# Patient Record
Sex: FEMALE | Race: WHITE
[De-identification: names, ages, dates, MRNs, and addresses within clinical notes are randomized per-mention and may not be internally consistent; named-entity substitution may affect disease eponyms.]

---

## 2017-03-22 ENCOUNTER — HOSPITAL ENCOUNTER (OUTPATIENT)
Dept: HOSPITAL 45 - C.LABSPEC | Age: 37
Discharge: HOME | End: 2017-03-22
Attending: PHYSICIAN ASSISTANT
Payer: COMMERCIAL

## 2017-03-22 DIAGNOSIS — N89.8: ICD-10-CM

## 2017-03-22 DIAGNOSIS — N94.9: Primary | ICD-10-CM

## 2017-03-22 DIAGNOSIS — Z11.3: ICD-10-CM

## 2017-03-25 LAB
CHLAMYDIA TRACH RNA***: NOT DETECTED
GC (NEIS GONORRHOEAE)RNA**: NOT DETECTED

## 2017-03-28 LAB
HERPES SIMPLEX CULT SOURCE: (no result)
HSV SPEC CULT: (no result)

## 2017-03-29 ENCOUNTER — HOSPITAL ENCOUNTER (INPATIENT)
Dept: HOSPITAL 45 - C.EDA | Age: 37
LOS: 2 days | Discharge: PSYCHIATRIC OR SUBSTANCE USE FACILITY | DRG: 881 | End: 2017-03-31
Attending: FAMILY MEDICINE | Admitting: HOSPITALIST
Payer: COMMERCIAL

## 2017-03-29 VITALS
SYSTOLIC BLOOD PRESSURE: 130 MMHG | HEART RATE: 87 BPM | TEMPERATURE: 98.24 F | DIASTOLIC BLOOD PRESSURE: 71 MMHG | OXYGEN SATURATION: 99 %

## 2017-03-29 VITALS
HEIGHT: 66 IN | WEIGHT: 231.49 LBS | BODY MASS INDEX: 37.2 KG/M2 | BODY MASS INDEX: 37.2 KG/M2 | HEIGHT: 66 IN | WEIGHT: 231.49 LBS

## 2017-03-29 VITALS — DIASTOLIC BLOOD PRESSURE: 71 MMHG | TEMPERATURE: 98.24 F | HEART RATE: 87 BPM | SYSTOLIC BLOOD PRESSURE: 130 MMHG

## 2017-03-29 VITALS — OXYGEN SATURATION: 100 %

## 2017-03-29 DIAGNOSIS — I45.81: ICD-10-CM

## 2017-03-29 DIAGNOSIS — I10: ICD-10-CM

## 2017-03-29 DIAGNOSIS — F17.210: ICD-10-CM

## 2017-03-29 DIAGNOSIS — F32.9: Primary | ICD-10-CM

## 2017-03-29 DIAGNOSIS — F10.239: ICD-10-CM

## 2017-03-29 DIAGNOSIS — T50.902A: ICD-10-CM

## 2017-03-29 DIAGNOSIS — R45.851: ICD-10-CM

## 2017-03-29 LAB
ALBUMIN/GLOB SERPL: 1.4 {RATIO} (ref 0.9–2)
ALP SERPL-CCNC: 48 U/L (ref 45–117)
ALP SERPL-CCNC: 50 U/L (ref 45–117)
ALT SERPL-CCNC: 14 U/L (ref 12–78)
ALT SERPL-CCNC: 18 U/L (ref 12–78)
ANION GAP SERPL CALC-SCNC: 10 MMOL/L (ref 3–11)
APAP SERPL-MCNC: (no result) UG/ML (ref 10–30)
AST SERPL-CCNC: 13 U/L (ref 15–37)
AST SERPL-CCNC: 14 U/L (ref 15–37)
BASOPHILS # BLD: 0.02 K/UL (ref 0–0.2)
BASOPHILS NFR BLD: 0.2 %
BENZODIAZ UR-MCNC: (no result) UG/L
BUN SERPL-MCNC: 6 MG/DL (ref 7–18)
BUN/CREAT SERPL: 7.5 (ref 10–20)
CALCIUM SERPL-MCNC: 9.2 MG/DL (ref 8.5–10.1)
CHLORIDE SERPL-SCNC: 106 MMOL/L (ref 98–107)
CO2 SERPL-SCNC: 25 MMOL/L (ref 21–32)
COMPLETE: YES
CREAT CL PREDICTED SERPL C-G-VRATE: 120.9 ML/MIN
CREAT SERPL-MCNC: 0.85 MG/DL (ref 0.6–1.2)
EOSINOPHIL NFR BLD AUTO: 237 K/UL (ref 130–400)
GLOBULIN SER-MCNC: 3.1 GM/DL (ref 2.5–4)
GLUCOSE SERPL-MCNC: 83 MG/DL (ref 70–99)
HCT VFR BLD CALC: 40.9 % (ref 37–47)
IG%: 0.3 %
IMM GRANULOCYTES NFR BLD AUTO: 6.4 %
LITHIUM SERPL-SCNC: < 0.2 MMOL/L (ref 0.6–1.2)
LYMPHOCYTES # BLD: 0.75 K/UL (ref 1.2–3.4)
MCH RBC QN AUTO: 32.6 PG (ref 25–34)
MCHC RBC AUTO-ENTMCNC: 35 G/DL (ref 32–36)
MCV RBC AUTO: 93.4 FL (ref 80–100)
MONOCYTES NFR BLD: 9.5 %
NEUTROPHILS # BLD AUTO: 0.1 %
NEUTROPHILS NFR BLD AUTO: 83.5 %
PCP UR-MCNC: (no result) UG/L
PMV BLD AUTO: 10 FL (ref 7.4–10.4)
POTASSIUM SERPL-SCNC: 3.5 MMOL/L (ref 3.5–5.1)
PREG INTERNAL NEGATIVE QC: (no result)
PREG INTERNAL POSITIVE QC: (no result)
RBC # BLD AUTO: 4.38 M/UL (ref 4.2–5.4)
SODIUM SERPL-SCNC: 141 MMOL/L (ref 136–145)
TSH SERPL-ACNC: 2.19 UIU/ML (ref 0.3–4.5)
WBC # BLD AUTO: 11.66 K/UL (ref 4.8–10.8)

## 2017-03-29 RX ADMIN — LORAZEPAM PRN MLS/MIN: 2 INJECTION INTRAMUSCULAR; INTRAVENOUS at 20:33

## 2017-03-29 RX ADMIN — LORAZEPAM PRN MLS/MIN: 2 INJECTION INTRAMUSCULAR; INTRAVENOUS at 22:12

## 2017-03-29 RX ADMIN — ACETAMINOPHEN PRN MG: 325 TABLET ORAL at 22:48

## 2017-03-29 RX ADMIN — SODIUM CHLORIDE SCH MLS/HR: 900 INJECTION, SOLUTION INTRAVENOUS at 20:33

## 2017-03-29 RX ADMIN — CHLORDIAZEPOXIDE HYDROCHLORIDE SCH MG: 25 CAPSULE ORAL at 20:32

## 2017-03-29 RX ADMIN — LORAZEPAM PRN MG: 2 INJECTION INTRAMUSCULAR; INTRAVENOUS at 18:48

## 2017-03-29 NOTE — PROGRESS NOTE
Progress Note


Date of Service


Mar 29, 2017.





Progress Note


ATTENDING ADDENDUM





care coordinated with JEF Royal


please refer to her notes for full details, I agree with her notes


patient seen and examined, JEF Royal present and witnessed entire 

interaction


records reviewed by myself as well


on exam, patient seen resting in bed, appears comfortable pleasant


states she is starting to have tremors, and anxiety


no hallucination


denies chest pain, dyspnea, palpitations or any other symptoms


expresses great enthusiasm with inpatient psych treatment





VS noted and reviewed


oriented x 3, not in distress, speaks in sentences with no effort nor 


               accessory muscle use


normal rate, regular rhythm, no murmurs


clear breath sounds bilaterally


non distended, soft, nontender


no bipedal edema, erythema, warmth


(+) mild hand tremors


no gross neuro deficits





WBC 11.6


Crea 0.85





ASSESSMENT/PLAN>


36 year old female with history of Alcoholism, presenting with depression and 

ingestion of unknown pills.





ALCOHOL WITHDRAWAL


HISTORY OF ALCOHOL ABUSE


- admits to drinking liquor every 3 days 


  last drink 2-3 days ago


- denies hallucinations


- start alcohol withdrawal protocol including:


   Librium PO taper


   PRN IV ativan


   IV fluids


   Thiamine





DEPRESSION


- evaluated by Psych


- plan for inpatient psych treatment after management of alcohol withdrawal


- patient agreeable to this


- 1:1 observation for now





INGESTION OF UNKNOWN PILLS


- urine drug screen positive for MDMA, Benzo


- QT mildly prolonged at 480


- monitor in Tele








other diagnoses and plan of care as per JEF Royal's notes





jL Babin MD

## 2017-03-29 NOTE — HISTORY AND PHYSICAL
History & Physical


Date & Time of Service:


Mar 29, 2017 at 16:43


Chief Complaint:


Overdose


Primary Care Physician:


Richard Maria M.D.


History of Present Illness


Source:  patient, clinic records, hospital records


Patient seen and examined. 36 year old female with PMHx of alcohol abuse, 

tobacco abuse and depression presents to the ED following an intentional 

overdose. Patient reports she was"done with life" so she took "a lot of pills." 

She states she took 33 pills a few days ago but then has been taking some more 

every day (timeline inconsistent throughout hospital stay when speaking with 

different providers). She states she does not know what these pills are just 

that they are ones lying around her house. She states she has been drinking on 

top of this. She states she will drink a 5th of mariola every 2-3 days. She 

states her last drink as 2 days ago.She states her mother was concerned and 

call an ambulance who brought her to the ED. She reports at this time she does 

not feel suicidal. She also denies homicidal ideations. She reports her first 

drink was at age 15 and drinking became a problem 5 or 10 years ago that she 

"just really likes it." She reports a stay at inpatient rehab for 22 days 

previously where she was on a Librium taper for withdrawal. She states she also 

had periods of sobriety when pregnant 5 and 10 years ago. She reports when she 

withdrawals she has diaphoresis, tremors and anxiety. She denies illicit drug 

use. She denies fevers, chills, URI symptoms, chest pain, SOB, nausea, vomiting

, diarrhea, dysuria, calf pain and edema. In the ED VS are stable, wbc count is 

mildly elevated. Tox screen is + MDMA and + benzodiazepines. She received Ativan

, IVFs and Zofran. Patient will be admitted to the internal medicine service 

with psychiatry consult.





Past Medical/Surgical History


Medical Problems:


(1) Alcohol addiction


Status: Chronic  





(2) Benign hypertension


Status: Chronic  





Surgical Problems:


(1) History of removal of ovarian cyst


Status: Chronic  








Family History





Diabetes mellitus


FH: cancer


FH: heart disease


Hypertension





Social History


Smoking Status:  Current Every Day Smoker


Alcohol Use:  heavy


Drug Use:  none


Housing status:  lives with family


Occupational Status:  employed





Immunizations


History of Influenza Vaccine:  Unknown


History of Tetanus Vaccine?:  Unknown


History of Pneumococcal:  Unknown


History of Hepatitis B Vaccine:  Unknown





Multi-Drug Resistant Organisms


History of MDRO:  No





Allergies


Coded Allergies:  


     No Known Allergies (Verified , 3/29/17)





Home Medications


Scheduled


[Iud], 1 EA PV UD





Review of Systems


See above for pertinent positives & negatives. A total of 10 systems reviewed 

and were otherwise negative.





Physical Exam


Vital Signs











  Date Time  Temp Pulse Resp B/P Pulse Ox O2 Delivery O2 Flow Rate FiO2


 


3/29/17 16:24  91 18 134/88 99 Room Air  


 


3/29/17 15:05  92      


 


3/29/17 13:51  87 18 151/92 98 Room Air  


 


3/29/17 11:49  90 18 125/82 98 Room Air  


 


3/29/17 10:29 36.8 98 20 139/97 99 Room Air  


 


3/29/17 10:08  99      








General Appearance:  + pertinent finding (Pleasant WD/WN 36 year old female 

lying in bed in NAD with mild hand tremor )


Head:  normocephalic, atraumatic


Eyes:  PERRL, EOMI, sclerae normal


ENT:  hearing grossly normal, pharynx normal


Neck:  supple, no JVD


Respiratory/Chest:  chest non-tender, lungs clear, normal breath sounds, no 

respiratory distress, no accessory muscle use


Cardiovascular:  regular rate, rhythm, no edema, no gallop, no JVD, no murmur, 

normal peripheral pulses


Abdomen/GI:  normal bowel sounds, non tender, soft


Back:  normal inspection, no muscle spasm


Extremities/Musculoskelatal:  no calf tenderness, normal capillary refill, no 

pedal edema


Neurologic/Psych:  alert, oriented x 3, + pertinent finding (mild tremor, 

otherwise no motor or sensory deficits. Denies suicidal and homicidal 

ideations. )


Skin:  normal color, warm/dry, no rash


Lymphatic:  no adenopathy





Diagnostics


Laboratory Results





Results Past 24 Hours








Test


  3/29/17


10:05 3/29/17


11:35 3/29/17


13:35 3/29/17


16:30 Range/Units


 


 


White Blood Count 11.66    4.8-10.8  K/uL


 


Red Blood Count 4.38    4.2-5.4  M/uL


 


Hemoglobin 14.3    12.0-16.0  g/dL


 


Hematocrit 40.9    37-47  %


 


Mean Corpuscular Volume 93.4      fL


 


Mean Corpuscular Hemoglobin 32.6    25-34  pg


 


Mean Corpuscular Hemoglobin


Concent 35.0


  


  


  


  32-36  g/dl


 


 


Platelet Count 237    130-400  K/uL


 


Mean Platelet Volume 10.0    7.4-10.4  fL


 


Neutrophils (%) (Auto) 83.5     %


 


Lymphocytes (%) (Auto) 6.4     %


 


Monocytes (%) (Auto) 9.5     %


 


Eosinophils (%) (Auto) 0.1     %


 


Basophils (%) (Auto) 0.2     %


 


Neutrophils # (Auto) 9.73    1.4-6.5  K/uL


 


Lymphocytes # (Auto) 0.75    1.2-3.4  K/uL


 


Monocytes # (Auto) 1.11    0.11-0.59  K/uL


 


Eosinophils # (Auto) 0.01    0-0.5  K/uL


 


Basophils # (Auto) 0.02    0-0.2  K/uL


 


RDW Standard Deviation 44.6    36.4-46.3  fL


 


RDW Coefficient of Variation 13.1    11.5-14.5  %


 


Immature Granulocyte % (Auto) 0.3     %


 


Immature Granulocyte # (Auto) 0.04    0.00-0.02  K/uL


 


Sodium Level 141    136-145  mmol/L


 


Potassium Level 3.5    3.5-5.1  mmol/L


 


Chloride Level 106      mmol/L


 


Carbon Dioxide Level 25    21-32  mmol/L


 


Anion Gap 10.0    3-11  mmol/L


 


Blood Urea Nitrogen 6    7-18  mg/dl


 


Creatinine


  0.85


  


  


  


  0.60-1.20


mg/dl


 


Est Creatinine Clear Calc


Drug Dose 120.9


  


  


  


   ml/min


 


 


Estimated GFR (


American) 103.6


  


  


  


   


 


 


Estimated GFR (Non-


American 89.4


  


  


  


   


 


 


BUN/Creatinine Ratio 7.5    10-20  


 


Random Glucose 83    70-99  mg/dl


 


Calcium Level 9.2    8.5-10.1  mg/dl


 


Total Bilirubin 1.9  2.1  0.2-1  mg/dl


 


Aspartate Amino Transf


(AST/SGOT) 13


  


  14


  


  15-37  U/L


 


 


Alanine Aminotransferase


(ALT/SGPT) 18


  


  14


  


  12-78  U/L


 


 


Alkaline Phosphatase 48  50    U/L


 


Total Protein 7.4  6.6  6.4-8.2  gm/dl


 


Albumin 4.3  4.1  3.4-5.0  gm/dl


 


Globulin 3.1    2.5-4.0  gm/dl


 


Albumin/Globulin Ratio 1.4    0.9-2  


 


Thyroid Stimulating Hormone


(TSH) 2.190


  


  


  


  0.300-4.500


uIu/ml


 


Salicylates Level     2.8-20  mg/dl


 


Acetaminophen Level     10-30  ug/ml


 


Lithium Level < 0.2    0.6-1.2  mMOL/L


 


Ethyl Alcohol mg/dL < 3.0    0-3  mg/dl


 


Urine Pregnancy Test  NEG   NEG  


 


Urine Opiates Screen  NEG   NEG  


 


Urine Methadone, Qualitative  NEG   NEG  


 


Urine Barbiturates  NEG   NEG  


 


Urine Phencyclidine (PCP)


Level 


  NEG


  


  


  NEG  


 


 


Ur


Amphetamine/Methamphetamine 


  NEG


  


  


  NEG  


 


 


MDMA (Ecstasy) Screen  POS   NEG  


 


Urine Benzodiazepines Screen  POS   NEG  


 


Urine Cocaine Metabolite  NEG   NEG  


 


Urine Marijuana (THC)  NEG   NEG  


 


Direct Bilirubin   0.4  0-0.2  mg/dl











EKG


NSR incomplete RBBB, prolonged QTc 482





Impression


Assessment and Plan


36 year old female presents to the ED following intentional unknown medication 

overdose. Also reports alcohol abuse 





ALCOHOL WITHDRAWAL 


-admit to tele 


-Alcohol level negative 


-has mild tremors at this time


-Banana bag x 1 


-start Librium protocol 


-Ativan prn withdrawal symptoms


-Daily multivitamin, thiamine and folic acid supplementation 


-monitor closely in tele for worsening withdrawal, currently mild  


-seizure precautions 


-psychiatry consultation 


-CBC, PRP, Mg in AM 





INTENTIONAL OVERDOSE -SUICIDE ATTEMPT 


-Unknown medications, wanted to "be done with life"


-Tox screen +MDMA, +benzodiazepine


-? prolonged QTc secondary to overdose  


-Suicide precautions - one to one observation 


-psychiatry consult placed for further input 





PROLONGED QTc


-482


-? secondary to unknown medication overdose


-check Mg 


-monitor in tele 





TOBACCO ABUSE


-Cessation counseling given 





DVT PROPHYLAXIS: SCDs 





CODE STATUS: FULL CODE





DISPO:In my clinical judgment this beneficiary meets acute admission criteria, 

established by CMS, that includes being hospitalized through two midnights.





Patient seen in collaboration with Dr. Babin





VTE Prophylaxis


VTE Risk Assessment Done? Y/N:  Yes


Risk Level:  Low

## 2017-03-29 NOTE — EMERGENCY ROOM VISIT NOTE
History


Report prepared by Matthew:  Clementina Enriquez


Under the Supervision of:  Dr. Denver Richardson D.O.


First contact with patient:  10:27


Chief Complaint:  OVERDOSE (INTENTIONAL)


Stated Complaint:  OVERDOSE





History of Present Illness


The patient is a 36 year old female who presents to the Emergency Room with 

complaints of a prescription drug overdose. The patient states that she is 

depressed and an alcoholic. She has been an alcoholic for about 11 years and 

believes that her alcoholism is what makes her depressed. She drinks heavily, 

about a fifth of liquor, every 3 days. Over the past few days, she has been 

taking pills that are in her house. She is not prescribed any medications. She 

is unsure of what the pills were or who they belonged too, although she reports 

that they could be her brother's. Her brother is medicated for psychiatric 

issues. She cannot describe what her exact intentions were of taking the pills 

although she states that she wants to start feeling better. She did not take 

the pills to try to kill herself. She took 5 of the pills a few days ago, 12 

pills another day, but only 2 this morning. The patient notes that an ambulance 

was called this morning because her mother was concerned about her. She admits 

to drinking alcohol this morning. She has the Mirena and does not get her 

menstrual period. Denies recreational drug use.





   Source of History:  patient


   Onset:  PTA


   Position:  other (psych)


   Quality:  other (overdose)


   Timing:  other (episodic)





Review of Systems


See HPI for pertinent positives & negatives. A total of 10 systems reviewed and 

were otherwise negative.





Past Medical & Surgical


Medical Problems:


(1) Alcohol addiction


(2) Benign hypertension


(3) Benign Hypertension


(4) Calculus Of Kidney


(5) Ovarian Cyst Nec/Nos








Family History





Diabetes mellitus


FH: cancer


FH: heart disease


Hypertension





Social History


Smoking Status:  Current Every Day Smoker


Alcohol Use:  none


Drug Use:  none


Housing Status:  lives with family


Occupation Status:  employed





Current/Historical Medications


Scheduled


[Iud], 1 EA PV UD





Allergies


Coded Allergies:  


     No Known Allergies (Verified , 3/29/17)





Physical Exam


Vital Signs











  Date Time  Temp Pulse Resp B/P Pulse Ox O2 Delivery O2 Flow Rate FiO2


 


3/29/17 15:05  92      


 


3/29/17 13:51  87 18 151/92 98 Room Air  


 


3/29/17 11:49  90 18 125/82 98 Room Air  


 


3/29/17 10:29 36.8 98 20 139/97 99 Room Air  


 


3/29/17 10:08  99      











Physical Exam


CONSTITUTIONAL/VITAL SIGNS: Reviewed / noted above.


GENERAL: Non-toxic in appearance. 


INTEGUMENTARY: Warm, dry, and Pink.


HEAD: Normocephalic.


EYES: without scleral icterus or trauma.


ENT/OROPHARYNX: clear and moist.


LYMPHADENOPATHY/NECK: Is supple without lymphadenopathy or meningismus.


RESPIRATORY: Lungs clear and equal.


CARDIOVASCULAR: Regular rate and rhythm.


GI/ABDOMEN: Soft and nontender. No organomegaly or pulsatile mass. No rebound 

or guarding. Normal bowel sounds.


EXTREMITIES: Warm and well perfused.


BACK: No CVA tenderness.


NEUROLOGICAL: Intact without focal deficits. 


PSYCHIATRIC: Depressed affect. Not suicidal. 


MUSCULOSKELETAL: Normally developed with good muscle tone.





Medical Decision & Procedures


Laboratory Results


3/29/17 10:05








Red Blood Count 4.38, Mean Corpuscular Volume 93.4, Mean Corpuscular Hemoglobin 

32.6, Mean Corpuscular Hemoglobin Concent 35.0, Mean Platelet Volume 10.0, 

Neutrophils (%) (Auto) 83.5, Lymphocytes (%) (Auto) 6.4, Monocytes (%) (Auto) 

9.5, Eosinophils (%) (Auto) 0.1, Basophils (%) (Auto) 0.2, Neutrophils # (Auto) 

9.73, Lymphocytes # (Auto) 0.75, Monocytes # (Auto) 1.11, Eosinophils # (Auto) 

0.01, Basophils # (Auto) 0.02





3/29/17 10:05

















Test


  3/29/17


10:05 3/29/17


11:35 3/29/17


13:35


 


White Blood Count


  11.66 K/uL


(4.8-10.8) 


  


 


 


Red Blood Count


  4.38 M/uL


(4.2-5.4) 


  


 


 


Hemoglobin


  14.3 g/dL


(12.0-16.0) 


  


 


 


Hematocrit 40.9 % (37-47)   


 


Mean Corpuscular Volume


  93.4 fL


() 


  


 


 


Mean Corpuscular Hemoglobin


  32.6 pg


(25-34) 


  


 


 


Mean Corpuscular Hemoglobin


Concent 35.0 g/dl


(32-36) 


  


 


 


Platelet Count


  237 K/uL


(130-400) 


  


 


 


Mean Platelet Volume


  10.0 fL


(7.4-10.4) 


  


 


 


Neutrophils (%) (Auto) 83.5 %   


 


Lymphocytes (%) (Auto) 6.4 %   


 


Monocytes (%) (Auto) 9.5 %   


 


Eosinophils (%) (Auto) 0.1 %   


 


Basophils (%) (Auto) 0.2 %   


 


Neutrophils # (Auto)


  9.73 K/uL


(1.4-6.5) 


  


 


 


Lymphocytes # (Auto)


  0.75 K/uL


(1.2-3.4) 


  


 


 


Monocytes # (Auto)


  1.11 K/uL


(0.11-0.59) 


  


 


 


Eosinophils # (Auto)


  0.01 K/uL


(0-0.5) 


  


 


 


Basophils # (Auto)


  0.02 K/uL


(0-0.2) 


  


 


 


RDW Standard Deviation


  44.6 fL


(36.4-46.3) 


  


 


 


RDW Coefficient of Variation


  13.1 %


(11.5-14.5) 


  


 


 


Immature Granulocyte % (Auto) 0.3 %   


 


Immature Granulocyte # (Auto)


  0.04 K/uL


(0.00-0.02) 


  


 


 


Anion Gap


  10.0 mmol/L


(3-11) 


  


 


 


Est Creatinine Clear Calc


Drug Dose 120.9 ml/min 


  


  


 


 


Estimated GFR (


American) 103.6 


  


  


 


 


Estimated GFR (Non-


American 89.4 


  


  


 


 


BUN/Creatinine Ratio 7.5 (10-20)   


 


Calcium Level


  9.2 mg/dl


(8.5-10.1) 


  


 


 


Globulin


  3.1 gm/dl


(2.5-4.0) 


  


 


 


Albumin/Globulin Ratio 1.4 (0.9-2)   


 


Thyroid Stimulating Hormone


(TSH) 2.190 uIu/ml


(0.300-4.500) 


  


 


 


Salicylates Level


  mg/dl


(2.8-20) 


  


 


 


Acetaminophen Level  ug/ml (10-30)   


 


Lithium Level


  < 0.2 mMOL/L


(0.6-1.2) 


  


 


 


Ethyl Alcohol mg/dL


  < 3.0 mg/dl


(0-3) 


  


 


 


Urine Pregnancy Test  NEG (NEG)  


 


Urine Opiates Screen  NEG (NEG)  


 


Urine Methadone, Qualitative  NEG (NEG)  


 


Urine Barbiturates  NEG (NEG)  


 


Urine Phencyclidine (PCP)


Level 


  NEG (NEG) 


  


 


 


Ur


Amphetamine/Methamphetamine 


  NEG (NEG) 


  


 


 


MDMA (Ecstasy) Screen  POS (NEG)  


 


Urine Benzodiazepines Screen  POS (NEG)  


 


Urine Cocaine Metabolite  NEG (NEG)  


 


Urine Marijuana (THC)  NEG (NEG)  


 


Total Bilirubin


  


  


  2.1 mg/dl


(0.2-1)


 


Direct Bilirubin


  


  


  0.4 mg/dl


(0-0.2)


 


Aspartate Amino Transf


(AST/SGOT) 


  


  14 U/L (15-37) 


 


 


Alanine Aminotransferase


(ALT/SGPT) 


  


  14 U/L (12-78) 


 


 


Alkaline Phosphatase


  


  


  50 U/L


()


 


Total Protein


  


  


  6.6 gm/dl


(6.4-8.2)


 


Albumin


  


  


  4.1 gm/dl


(3.4-5.0)





Laboratory results as stated above per my review.





Medications Administered











 Medications


  (Trade)  Dose


 Ordered  Sig/Tish


 Route  Start Time


 Stop Time Status Last Admin


Dose Admin


 


 Sodium Chloride


  (Nss 1000ml)  1,000 ml @ 


 999 mls/hr  Q1H1M STAT


 IV  3/29/17 11:47


 3/29/17 12:47 DC 3/29/17 11:59


999 MLS/HR


 


 Ondansetron HCl


  (Zofran Inj)  4 mg  NOW  STAT


 IV  3/29/17 14:23


 3/29/17 14:24 DC 3/29/17 14:46


4 MG


 


 Lorazepam


  (Ativan Tab)  1 mg  NOW  STAT


 SL  3/29/17 15:50


 3/29/17 15:51 DC 3/29/17 15:55


1 MG











ECG


Indication:  toxicologic


Rate (beats per minute):  93


Rhythm:  normal sinus


Findings:  no ectopy, other (no acute injury)





ED Course


1029: Previous medical records were reviewed. The patient was evaluated in room 

A9. A complete history and physical examination was performed.





1147: Ordered NSS 1000 ml @ 999 mls/hr IV. 





1423: Ordered Zofran Inj 4 mg IV.





1550: Ordered Lorazepam 1 mg SL.





1558: On reevaluation, the patient is resting comfortably. I discussed the 

results and findings with the patient. She verbalized agreement of the 

treatment plan. [facility] 

The patient will be evaluated for further management and care.





Medical Decision


Differential diagnosis:





Etiologies such as mood disorder, infection, hypoglycemia, electrolyte 

abnormalities, cardiac sources, intracerebral event, toxicologic, neurologic, 

as well as others were entertained.





This is a 36-year-old female who presents to the ED with a chief complaint of 

an overdose and depression.  The patient reports that she has been drinking 

alcohol.  She also reports that she took a couple of her brothers those.  She 

is uncertain of what pills she took.  The patient would like to be admitted for 

mental health reasons.  She is feeling depressed.  She is currently not on 

medication for this.  Her blood work was unremarkable.  Bilirubin was slightly 

elevated.  Pregnancy test is negative.  Toxin was positive for benzos and MDMA.

  Alcohol is negative.  Pregnancy was negative.  EKG shows a sinus rhythm.  The 

patient had some mild tremors during her ED stay.  She was given Ativan by 

mouth for this.  Mental health services saw the patient and felt that she would 

be a good candidate for admission but felt the patient should be observed for 

24 hours on the medical service first.  [facility]

  The patient will be admitted for the observation with psychiatric consult.





Consults


Time Called:  [id number]


[facility]ing [facility]  [facility]


Returned Call:  [phone]


I discussed the case with him. The patient will be evaluated for further 

management.





Impression





 Primary Impression:  


 Depression


 Additional Impressions:  


 Alcohol use


 Overdose





Scribe Attestation


The scribe's documentation has been prepared under my direction and personally 

reviewed by me in its entirety. I confirm that the note above accurately 

reflects all work, treatment, procedures, and medical decision making performed 

by me.





Departure Information


Dispostion


Being Evaluated By Hospitalist





Richard Fitzpatrick M.D. (PCP)





Patient Instructions


My [facility]





Problem Qualifiers

## 2017-03-30 VITALS
TEMPERATURE: 98.42 F | OXYGEN SATURATION: 99 % | HEART RATE: 76 BPM | SYSTOLIC BLOOD PRESSURE: 128 MMHG | DIASTOLIC BLOOD PRESSURE: 84 MMHG

## 2017-03-30 VITALS
SYSTOLIC BLOOD PRESSURE: 143 MMHG | HEART RATE: 94 BPM | OXYGEN SATURATION: 98 % | DIASTOLIC BLOOD PRESSURE: 97 MMHG | TEMPERATURE: 98.78 F

## 2017-03-30 VITALS
DIASTOLIC BLOOD PRESSURE: 88 MMHG | TEMPERATURE: 97.88 F | HEART RATE: 77 BPM | SYSTOLIC BLOOD PRESSURE: 136 MMHG | OXYGEN SATURATION: 99 %

## 2017-03-30 VITALS
DIASTOLIC BLOOD PRESSURE: 76 MMHG | TEMPERATURE: 98.42 F | HEART RATE: 78 BPM | SYSTOLIC BLOOD PRESSURE: 115 MMHG | OXYGEN SATURATION: 98 %

## 2017-03-30 VITALS
SYSTOLIC BLOOD PRESSURE: 128 MMHG | OXYGEN SATURATION: 97 % | HEART RATE: 84 BPM | DIASTOLIC BLOOD PRESSURE: 82 MMHG | TEMPERATURE: 98.06 F

## 2017-03-30 VITALS
DIASTOLIC BLOOD PRESSURE: 56 MMHG | HEART RATE: 89 BPM | OXYGEN SATURATION: 99 % | SYSTOLIC BLOOD PRESSURE: 94 MMHG | TEMPERATURE: 98.42 F

## 2017-03-30 VITALS
HEART RATE: 86 BPM | SYSTOLIC BLOOD PRESSURE: 127 MMHG | TEMPERATURE: 98.42 F | OXYGEN SATURATION: 99 % | DIASTOLIC BLOOD PRESSURE: 84 MMHG

## 2017-03-30 LAB
ANION GAP SERPL CALC-SCNC: 8 MMOL/L (ref 3–11)
BUN SERPL-MCNC: 5 MG/DL (ref 7–18)
BUN/CREAT SERPL: 6.9 (ref 10–20)
CALCIUM SERPL-MCNC: 8.1 MG/DL (ref 8.5–10.1)
CHLORIDE SERPL-SCNC: 110 MMOL/L (ref 98–107)
CO2 SERPL-SCNC: 24 MMOL/L (ref 21–32)
CREAT CL PREDICTED SERPL C-G-VRATE: 128.3 ML/MIN
CREAT SERPL-MCNC: 0.76 MG/DL (ref 0.6–1.2)
EOSINOPHIL NFR BLD AUTO: 172 K/UL (ref 130–400)
GLUCOSE SERPL-MCNC: 91 MG/DL (ref 70–99)
HCT VFR BLD CALC: 37.7 % (ref 37–47)
MAGNESIUM SERPL-MCNC: 2.3 MG/DL (ref 1.8–2.4)
MCH RBC QN AUTO: 31.9 PG (ref 25–34)
MCHC RBC AUTO-ENTMCNC: 34.5 G/DL (ref 32–36)
MCV RBC AUTO: 92.4 FL (ref 80–100)
PMV BLD AUTO: 9.9 FL (ref 7.4–10.4)
POTASSIUM SERPL-SCNC: 3.5 MMOL/L (ref 3.5–5.1)
RBC # BLD AUTO: 4.08 M/UL (ref 4.2–5.4)
SODIUM SERPL-SCNC: 142 MMOL/L (ref 136–145)
WBC # BLD AUTO: 7.86 K/UL (ref 4.8–10.8)

## 2017-03-30 RX ADMIN — CHLORDIAZEPOXIDE HYDROCHLORIDE SCH MG: 25 CAPSULE ORAL at 13:44

## 2017-03-30 RX ADMIN — LORAZEPAM PRN MLS/MIN: 2 INJECTION INTRAMUSCULAR; INTRAVENOUS at 05:45

## 2017-03-30 RX ADMIN — ACETAMINOPHEN PRN MG: 325 TABLET ORAL at 13:38

## 2017-03-30 RX ADMIN — CHLORDIAZEPOXIDE HYDROCHLORIDE SCH MG: 25 CAPSULE ORAL at 02:17

## 2017-03-30 RX ADMIN — DOCUSATE SODIUM SCH MG: 100 CAPSULE, LIQUID FILLED ORAL at 21:15

## 2017-03-30 RX ADMIN — SODIUM CHLORIDE SCH MLS/HR: 900 INJECTION, SOLUTION INTRAVENOUS at 22:12

## 2017-03-30 RX ADMIN — GABAPENTIN SCH MG: 600 TABLET, FILM COATED ORAL at 16:23

## 2017-03-30 RX ADMIN — GABAPENTIN SCH MG: 600 TABLET, FILM COATED ORAL at 21:15

## 2017-03-30 RX ADMIN — SODIUM CHLORIDE SCH MLS/HR: 900 INJECTION, SOLUTION INTRAVENOUS at 02:18

## 2017-03-30 RX ADMIN — SODIUM CHLORIDE SCH MLS/HR: 900 INJECTION, SOLUTION INTRAVENOUS at 12:52

## 2017-03-30 RX ADMIN — LORAZEPAM PRN MG: 2 INJECTION INTRAMUSCULAR; INTRAVENOUS at 13:38

## 2017-03-30 RX ADMIN — Medication SCH MG: at 07:53

## 2017-03-30 RX ADMIN — Medication SCH TAB: at 07:53

## 2017-03-30 RX ADMIN — LORAZEPAM PRN MG: 2 INJECTION INTRAMUSCULAR; INTRAVENOUS at 18:48

## 2017-03-30 RX ADMIN — CHLORDIAZEPOXIDE HYDROCHLORIDE SCH MG: 25 CAPSULE ORAL at 21:16

## 2017-03-30 RX ADMIN — CHLORDIAZEPOXIDE HYDROCHLORIDE SCH MG: 25 CAPSULE ORAL at 07:52

## 2017-03-30 RX ADMIN — LORAZEPAM PRN MLS/MIN: 2 INJECTION INTRAMUSCULAR; INTRAVENOUS at 22:24

## 2017-03-30 NOTE — CONSULTATION REPORT
DATE OF CONSULTATION:  03/30/2017

 

DATE OF CONSULTATION:  03/30/2017.  

 

IDENTIFYING DATA:  Nandini Huynh is a  36-year-old woman from [place], [place], who is currently admitted to the medical floor with alcohol

withdrawal syndrome, depression, status post overdose attempt.  Information

is gathered from the patient and considered to be reliable.

 

CHIEF COMPLAINT:  "I tried to kill myself."

 

HISTORY OF PRESENT ILLNESS:  Nandini Huynh is a 36-year-old woman who is

currently under a great deal of stress.  She is in the process of going

through the divorce and as part of the picture had to sell her home and move

in with her parents 6 months ago.  She has 2 sons, ages 5 and 10.   She

indicates that living with them has been "terrible" and admits that her

drinking has been part of the problem.  She says that her parents drink and

therefore alcohol is available in their home which makes it difficult for her

to stop.  She says that she has been intermittently suicidal, but generally

only when she is drinking.  She admits that she overdosed on unknown pills

last week and again taking 33 pills of Wellbutrin  and other unknown pills

several days ago and continuing to take more in subsequent days.  She admits

that her drinking has been a problem, consuming a fifth of mariola every 2-3

days.  Today, she says that she continues to feel depressed, but not actively

suicidal.  She reports that her sleep recently has been impaired getting only

4 hours of sleep per night, being up and down through most of the night.  Her

appetite has been down, but she admits to substituting alcohol for her

calories and ate virtually nothing for the 4 days prior to admission.  She

reports a pattern of chronic anxiety with occasional panic attacks that can

be triggered by arguing with her mother or "anything."  She does not normally

experience hallucinations of any time, but since being without alcohol she is

having visual disturbances, twilight in nature in which she wakes up from

resting and believes that she is smoking a cigarette or sees her boyfriend

Sin in the room.  She denies any history of self-injurious behaviors.  She

denies any history of eating disordered behaviors.  She denies any discrete

episodes of euphoric mood, sleeplessness or pleasure seeking behaviors that

would be congruent with bipolar disorder.

 

CURRENT MEDICATIONS:

1. Neurontin protocol for withdrawal.

2. Librium protocol for withdrawal.

3. Senokot 8.6 mg q.a.m.

4. Colace 100 mg b.i.d.

5. Folic acid 1 mg q.a.m.

6. Thiamine 100 mg q.a.m.

7. Multivite 1 tab q.a.m.

 

PAST PSYCHIATRIC HISTORY:  The patient denies that she has ever seen a

psychiatric professional.  She has never been hospitalized for mental health

reasons.  Prior to last week she had not made a suicide attempt.

 

PRIOR MEDICATION TRIALS:

1. Wellbutrin.

2. Prozac.

 

ACCESS TO GUNS:  Denies.

 

ALLERGIES:  NKDA.

 

PAST MEDICAL HISTORY:

1. Benign hypertension.

2. Denies history for head injury or seizures.

 

FAMILY HISTORY:  Positive for brother with schizophrenia, mother and father

with alcohol abuse issues.  There is no family history for suicide. 

Medically, there is positive family history for heart disease, hypertension,

diabetes, and cancer.

 

SUBSTANCE USE HISTORY:  In the last year, the patient endorses heavy drinking

as much as a fifth of alcohol every 3 days, usually mariola.  She has been in

rehab before at [facility] 2 years ago.  She stayed for 22 days and was able

to maintain sobriety for 3 weeks after discharge.  She denies any legal

consequences as a result of substances.  She denies the use of street drugs

now or at any point in the past.

 

PERSONAL HISTORY:  The patient currently resides with her parents in

[place].  She has 1 brother.  She is employed at 2 stores, at [employer] and

[employer].   She is a high school graduate and has some education at

[employer].  She is  and recently had a breakup with a boyfriend. 

She has 2 children, ages 5-10.  She denies legal concerns.  Psychological

trauma history is denied.

 

MENTAL STATUS EXAMINATION:  A  36-year-old woman with short dark hair,

dressed in hospital gown, lying in her hospital bed.  She is awakened from

sleep for the interview and is  alert and cooperative.  Eye contact is good. 

Motor behavior is unremarkable, no visible tremors.  Speech is of normal

rate, volume, and tone.  Affect is flat.  Mood is depressed.  Thought process

is organized and goal directed.  She denies thought disorder in the form of

delusions, but is having withdrawal seizures including visual images of

seeing her boyfriend in the room.  Today, she is fully oriented.  Her memory

functions appear to be intact per conversation.  Fund of knowledge is intact.

 Intelligence is estimated to be average.  Insight and judgment are currently

impaired.

 

VITAL SIGNS:  Temp 36.9, pulse 89, respirations 20, blood pressure 94/56,

pulse ox 99% on room air.

 

LABORATORIES:

1. CBC -- notable for low RBCs 4.08.

2. Chem profile -- notable for chloride 110, calcium low at 8.1, total

bilirubin elevated at 2.2.  Direct bilirubin elevated at 0.3.

3. TSH -- within normal limits at 2.190.

4. Toxicology -- positive for benzodiazepines and MDMA which is usually a

false positive.  Alcohol was negative.

5. Urine pregnancy test -- negative.

 

REVIEW OF SYSTEMS:  Positive for complaints of visual disturbances, fatigue,

recent tremors, depression.

 

PHYSICAL EXAMINATION:  As per Denise Royal PA-C.

 

IMPRESSION:  A 36-year-old woman admitted to the hospital following an

overdose attempt and alcohol withdrawal.  She has not received any ____

mental health treatment in the past, although has been in rehab 2 years ago

for her alcohol dependence.  Our primary goal is to get her medically cleared

from her alcohol withdrawal.  I agree with the use of Neurontin and Librium

protocol with AWSS  protocol for alcohol withdrawal.  I have recommended that

she receive inpatient mental health treatment when medically cleared and at

this point she is willing to do that, although does hedge at some times

saying she would rather pursue outpatient because of the need to be with her

children.  We will continue to address this with her.  There is a backup 302

petitioners statement on the chart that could be used in the event she

refuses voluntary treatment.  We will not recommend any antidepressants at

this point until she has had a chance to clear medically.  The patient should

not be allowed to leave against medical advice without being evaluated by

psychiatry first.

 

DIAGNOSES:

1. Alcohol dependence.

2. Depressive disorder, not otherwise specified.

3. Hypertension.

 

PLAN:  Has been reviewed with Dr. Baylee Burnham.

1. Alcohol dependence and withdrawal.

-- Agree with AWSS  protocol using gabapentin and Librium protocol.

-- Will likely recommend inpatient rehab after  psychiatrically stable.

2. Depressive disorder, not otherwise specified.  Differential includes

substance induced mood disorder, major depressive disorder.

--  Will hold on medication recommendations until medically cleared.

-- Recommend inpatient mental health treatment.

-- The patient should not be allowed to leave against medical advice without

being seen by psychiatry.

3. Hypertension.

-- As per the  medical team.  

 

We thank you for allowing us to participate in this woman's care.

## 2017-03-30 NOTE — PROGRESS NOTE
Subjective


Date of Service:


Mar 30, 2017.


Subjective


Pt evaluation today including:  conversation w/ patient, physical exam, lab 

review, review of studies, conversation w/ consultant, review of inpatient 

medication list


Saw/examined the patient in room 287


She states that she intentionally overdose on multiple medications - unknown 

which medications though it did include Wellbutrin


Tells me she is supposed to take Wellbutrin, Prozac and Librium at home, but 

does not


knows she has an alcohol abuse problem


Wants to go to [address] [place] [address] mental health unit to find help for depression, 

suicidal ideation, and alcohol/drug abuse


currently feels fine - was hallucinating overnight, but no longer; no anxiety, 

tachycardia, etc.





Problem List


Medical Problems:


(1) Alcohol use


Status: Acute  





(2) Depression


Status: Acute  





(3) Hand laceration


Status: Acute  





(4) Multiple contusions


Status: Acute  





(5) Overdose


Status: Acute  











Review of Systems


Respiratory:  No shortness of breath


Cardiac:  No chest pain


Psychiatric:  + anxiety, + depression symptoms, + insomnia, + problem reported (

hallucinations), + see HPI, + substance abuse





Medications





 Current Inpatient Medications








 Medications


  (Trade)  Dose


 Ordered  Sig/Tish


 Route  Start Time


 Stop Time Status Last Admin


Dose Admin


 


 Acetaminophen


  (Tylenol Tab)  650 mg  Q4H  PRN


 PO  3/29/17 16:30


 4/28/17 16:29  3/30/17 13:38


650 MG


 


 Folic Acid


  (Folvite Tab)  1 mg  QAM


 PO  3/30/17 09:00


 4/29/17 08:59  3/30/17 07:53


1 MG


 


 Thiamine HCl


  (Vitamin B-1 Tab)  100 mg  QAM


 PO  3/30/17 09:00


 4/29/17 08:59  3/30/17 07:53


100 MG


 


 Multivitamins 1


 tab  1 tab  QAM


 PO  3/30/17 09:00


 4/29/17 08:59  3/30/17 07:53


1 TAB


 


 Sodium Chloride


  (Nss 1000ml)  1,000 ml @ 


 100 mls/hr  Q10H


 IV  3/29/17 16:39


 4/28/17 16:38  3/30/17 12:52


100 MLS/HR


 


 Lorazepam


  (Ativan Inj)  1 mg  Q1H  PRN


 IV  3/29/17 16:45


 4/28/17 16:44  3/30/17 13:38


1 MG


 


 Chlordiazepoxide


  (Librium Cap)  50 mg  Q8H


 PO  3/30/17 22:00


 3/31/17 14:01   


 


 


 Chlordiazepoxide


  (Librium Cap)  25 mg  Q8H


 PO  3/31/17 22:00


 4/1/17 14:01   


 


 


 Chlordiazepoxide


 10 mg  10 mg  Q12H


 PO  4/2/17 06:00


 4/2/17 18:01   


 


 


 Lorazepam/Syringe


  (Ativan Inj/


 Syringe)  1 ml @ 1


 mls/min  Q1H  PRN


 IV  3/29/17 20:30


 4/28/17 20:29  3/30/17 05:45


1 MLS/MIN


 


 Docusate Sodium


  (coLACE CAP)  100 mg  BID


 PO  3/30/17 21:00


 4/29/17 20:59   


 


 


 Senna


  (Senokot Tab)  8.6 mg  QAM


 PO  3/31/17 09:00


 4/30/17 08:59   


 


 


 Gabapentin


  (Neurontin Tab)  600 mg  Q6H


 PO  3/30/17 16:00


 3/30/17 22:01  3/30/17 16:23


600 MG


 


 Gabapentin


  (Neurontin Tab)  600 mg  Q8H


 PO  3/31/17 06:00


 3/31/17 22:01   


 


 


 Gabapentin


  (Neurontin Tab)  600 mg  Q12H


 PO  4/1/17 09:00


 4/1/17 21:01   


 


 


 Gabapentin


  (Neurontin Tab)  600 mg  Q24H


 PO  4/2/17 09:00


 4/2/17 09:01   


 











Objective


Vital Signs











  Date Time  Temp Pulse Resp B/P Pulse Ox O2 Delivery O2 Flow Rate FiO2


 


3/30/17 14:58 37.1 94 18 143/97 98 Room Air  


 


3/30/17 12:00      Room Air  


 


3/30/17 11:57 36.6 77 18 136/88 99 Room Air  


 


3/30/17 08:00      Room Air  


 


3/30/17 07:57 36.9 89 20 94/56 99 Room Air  


 


3/30/17 04:00      Room Air  


 


3/30/17 04:00 36.9 86 18 127/84 99 Room Air  


 


3/30/17 00:33 36.9 76 20 128/84 99 Room Air  


 


3/30/17 00:22      Room Air  


 


3/29/17 21:14 36.8 87 18 130/71  Room Air  


 


3/29/17 20:00 36.8 87 18 130/71 99 Room Air  


 


3/29/17 18:38  94 18 131/86 100 Room Air  











Physical Exam


General Appearance:  no apparent distress, + pertinent finding (mild 

tremulousness at baseline)


Respiratory/Chest:  lungs clear, normal breath sounds, no respiratory distress, 

no accessory muscle use


Cardiovascular:  regular rate, rhythm, no edema, no murmur


Abdomen:  normal bowel sounds, non tender, soft


Extremities:  normal inspection, no pedal edema





Laboratory Results





Last 24 Hours








Test


  3/30/17


04:46


 


White Blood Count 7.86 K/uL 


 


Red Blood Count 4.08 M/uL 


 


Hemoglobin 13.0 g/dL 


 


Hematocrit 37.7 % 


 


Mean Corpuscular Volume 92.4 fL 


 


Mean Corpuscular Hemoglobin 31.9 pg 


 


Mean Corpuscular Hemoglobin


Concent 34.5 g/dl 


 


 


RDW Standard Deviation 43.6 fL 


 


RDW Coefficient of Variation 13.0 % 


 


Platelet Count 172 K/uL 


 


Mean Platelet Volume 9.9 fL 


 


Sodium Level 142 mmol/L 


 


Potassium Level 3.5 mmol/L 


 


Chloride Level 110 mmol/L 


 


Carbon Dioxide Level 24 mmol/L 


 


Anion Gap 8.0 mmol/L 


 


Blood Urea Nitrogen 5 mg/dl 


 


Creatinine 0.76 mg/dl 


 


Est Creatinine Clear Calc


Drug Dose 128.3 ml/min 


 


 


Estimated GFR (


American) 118.6 


 


 


Estimated GFR (Non-


American 102.3 


 


 


BUN/Creatinine Ratio 6.9 


 


Random Glucose 91 mg/dl 


 


Calcium Level 8.1 mg/dl 


 


Magnesium Level 2.3 mg/dl 


 


Total Bilirubin 2.2 mg/dl 


 


Direct Bilirubin 0.3 mg/dl 











Assessment and Plan


This is a 36 year old female with PMH of depression, alcohol and tobacco abuse 

presents with intentional overdose





Intentional Overdose


Suicidal Attempt


patient intentionally took multiple pills; unknown which ones


patient is supposed to take Wellbutrin, Prozac - but does not take these


is willing to start medications for depression


plan is for her to go to the Mental Health Unit after she is medically stable


EKG obtained this morning shows a QTc ~ 450; improving from previous one





Alcohol Abuse; Withdrawal Symptoms


monitoring for DTs


currently on Librium protocol, appreciate psych input


started gabapentin as well


Ativan PRN for anxiety, withdrawal symptoms


+hallucinations on the night of 3/29; none since





DVT ppx


SCDs





FULL CODE

## 2017-03-31 ENCOUNTER — HOSPITAL ENCOUNTER (INPATIENT)
Dept: HOSPITAL 45 - C.MHU | Age: 37
LOS: 4 days | Discharge: HOME | DRG: 885 | End: 2017-04-04
Attending: PSYCHIATRY & NEUROLOGY | Admitting: PSYCHIATRY & NEUROLOGY
Payer: COMMERCIAL

## 2017-03-31 VITALS
WEIGHT: 231.49 LBS | HEIGHT: 69.02 IN | WEIGHT: 231.49 LBS | BODY MASS INDEX: 34.29 KG/M2 | HEIGHT: 69.02 IN | BODY MASS INDEX: 34.29 KG/M2

## 2017-03-31 VITALS — SYSTOLIC BLOOD PRESSURE: 124 MMHG | DIASTOLIC BLOOD PRESSURE: 82 MMHG | HEART RATE: 87 BPM | TEMPERATURE: 97.52 F

## 2017-03-31 VITALS — TEMPERATURE: 98.06 F | SYSTOLIC BLOOD PRESSURE: 137 MMHG | HEART RATE: 89 BPM | DIASTOLIC BLOOD PRESSURE: 89 MMHG

## 2017-03-31 VITALS
HEART RATE: 89 BPM | SYSTOLIC BLOOD PRESSURE: 122 MMHG | TEMPERATURE: 98.42 F | DIASTOLIC BLOOD PRESSURE: 82 MMHG | OXYGEN SATURATION: 97 %

## 2017-03-31 VITALS
DIASTOLIC BLOOD PRESSURE: 82 MMHG | HEART RATE: 89 BPM | TEMPERATURE: 98.42 F | OXYGEN SATURATION: 97 % | SYSTOLIC BLOOD PRESSURE: 122 MMHG

## 2017-03-31 VITALS
OXYGEN SATURATION: 98 % | DIASTOLIC BLOOD PRESSURE: 79 MMHG | TEMPERATURE: 97.52 F | SYSTOLIC BLOOD PRESSURE: 123 MMHG | HEART RATE: 68 BPM

## 2017-03-31 VITALS
HEART RATE: 86 BPM | OXYGEN SATURATION: 99 % | TEMPERATURE: 98.24 F | SYSTOLIC BLOOD PRESSURE: 132 MMHG | DIASTOLIC BLOOD PRESSURE: 86 MMHG

## 2017-03-31 DIAGNOSIS — I10: ICD-10-CM

## 2017-03-31 DIAGNOSIS — F41.9: ICD-10-CM

## 2017-03-31 DIAGNOSIS — F33.9: Primary | ICD-10-CM

## 2017-03-31 DIAGNOSIS — F17.200: ICD-10-CM

## 2017-03-31 DIAGNOSIS — B96.89: ICD-10-CM

## 2017-03-31 DIAGNOSIS — N76.0: ICD-10-CM

## 2017-03-31 DIAGNOSIS — F10.239: ICD-10-CM

## 2017-03-31 DIAGNOSIS — F10.24: ICD-10-CM

## 2017-03-31 DIAGNOSIS — Z83.3: ICD-10-CM

## 2017-03-31 LAB
ANION GAP SERPL CALC-SCNC: 8 MMOL/L (ref 3–11)
BUN SERPL-MCNC: 5 MG/DL (ref 7–18)
BUN/CREAT SERPL: 5.8 (ref 10–20)
CALCIUM SERPL-MCNC: 8.6 MG/DL (ref 8.5–10.1)
CHLORIDE SERPL-SCNC: 109 MMOL/L (ref 98–107)
CO2 SERPL-SCNC: 25 MMOL/L (ref 21–32)
CREAT CL PREDICTED SERPL C-G-VRATE: 119.8 ML/MIN
CREAT SERPL-MCNC: 0.81 MG/DL (ref 0.6–1.2)
EOSINOPHIL NFR BLD AUTO: 166 K/UL (ref 130–400)
GLUCOSE SERPL-MCNC: 109 MG/DL (ref 70–99)
HCT VFR BLD CALC: 38.8 % (ref 37–47)
MAGNESIUM SERPL-MCNC: 2.1 MG/DL (ref 1.8–2.4)
MCH RBC QN AUTO: 31.7 PG (ref 25–34)
MCHC RBC AUTO-ENTMCNC: 34 G/DL (ref 32–36)
MCV RBC AUTO: 93 FL (ref 80–100)
PMV BLD AUTO: 10.1 FL (ref 7.4–10.4)
POTASSIUM SERPL-SCNC: 3.8 MMOL/L (ref 3.5–5.1)
RBC # BLD AUTO: 4.17 M/UL (ref 4.2–5.4)
SODIUM SERPL-SCNC: 142 MMOL/L (ref 136–145)
WBC # BLD AUTO: 5.97 K/UL (ref 4.8–10.8)

## 2017-03-31 RX ADMIN — Medication SCH TAB: at 07:53

## 2017-03-31 RX ADMIN — GABAPENTIN SCH MG: 600 TABLET, FILM COATED ORAL at 14:52

## 2017-03-31 RX ADMIN — LORAZEPAM PRN MG: 2 INJECTION INTRAMUSCULAR; INTRAVENOUS at 11:03

## 2017-03-31 RX ADMIN — CHLORDIAZEPOXIDE HYDROCHLORIDE SCH MG: 25 CAPSULE ORAL at 15:02

## 2017-03-31 RX ADMIN — Medication SCH MG: at 07:54

## 2017-03-31 RX ADMIN — SODIUM CHLORIDE SCH MLS/HR: 900 INJECTION, SOLUTION INTRAVENOUS at 07:55

## 2017-03-31 RX ADMIN — GABAPENTIN SCH MG: 600 TABLET, FILM COATED ORAL at 05:33

## 2017-03-31 RX ADMIN — LORAZEPAM PRN MG: 2 INJECTION INTRAMUSCULAR; INTRAVENOUS at 04:04

## 2017-03-31 RX ADMIN — LORAZEPAM PRN MG: 2 INJECTION INTRAMUSCULAR; INTRAVENOUS at 02:09

## 2017-03-31 RX ADMIN — DOCUSATE SODIUM SCH MG: 100 CAPSULE, LIQUID FILLED ORAL at 08:44

## 2017-03-31 RX ADMIN — CHLORDIAZEPOXIDE HYDROCHLORIDE SCH MG: 25 CAPSULE ORAL at 05:33

## 2017-03-31 NOTE — DISCHARGE INSTRUCTIONS
Discharge Instructions


Date of Service


Mar 31, 2017.





Admission


Reason for Admission:  Alcohol Withdrawal, Overdose





Discharge


Discharge Diagnosis / Problem:  Intentional Drug Overdose, Suicidal Attempt, 

Alcohol Abuse/Withdrawal





Discharge Goals


Goal(s):  Decrease discomfort, Improve function





Activity Recommendations


Activity Limitations:  resume your previous activity





.





Instructions / Follow-Up


Instructions / Follow-Up


To be discharged to mental health unit


Continue Thiamine, Folate, Multivitamin


Gabapentin protocol for alcohol withdrawal symptoms





Current Hospital Diet


Patient's current hospital diet: Regular Diet





Discharge Diet


Recommended Diet:  Regular Diet





Pending Studies


Studies pending at discharge:  no





Medical Emergencies








.


Who to Call and When:





Medical Emergencies:  If at any time you feel your situation is an emergency, 

please call 911 immediately.





.





Non-Emergent Contact


Non-Emergency issues call your:  Primary Care Provider





.


.





"Provider Documentation" section prepared by Reza Peacock.





VTE Core Measure


Inpt VTE Proph given/why not?:  SCD's

## 2017-03-31 NOTE — PSYCHIATRIC PROGRESS NOTES
Psychiatric Progress Note


Date of Service


Mar 31, 2017.





Notes


ID: Patient reviewed with liaison nurse. Initial consult by ALEXANDRE Tirado 

reviewed.  


CC: "I still feel a bit funny"


HPI: patient describes illusions related to her medication, misperceiving 

sounds going on around her as her mother cooking breakfast for example.  She 

was able to eat breakfast and is ambulating to the bathroom.  Tremors are 

minimal and decreased.


ROS: tremor improved, gait improving


MSE: alert, depressed, affect calm currently, thoughts organized, denies SI 

currently on medical floor but clearly states suicide attempt and not able to 

safety contract for discharge


Imp: as per initial consult


Plan: 201 when medically cleared (patient remains agreeable), 3S if bed still 

available.  Patient reviewed with Dr. Peacock.  VSS.

## 2017-03-31 NOTE — PROGRESS NOTE
Subjective


Date of Service:


Mar 31, 2017.


Subjective


Pt evaluation today including:  conversation w/ patient, physical exam, lab 

review, review of studies, review of inpatient medication list


Saw/examined the patient in room 287


No problems/issues to note; +/- hallucinations at night, though doing fine now, 

no acute anxiety/distress





Problem List


Medical Problems:


(1) Alcohol use


Status: Acute  





(2) Depression


Status: Acute  





(3) Hand laceration


Status: Acute  





(4) Multiple contusions


Status: Acute  





(5) Overdose


Status: Acute  











Review of Systems


Neurologic:  No balance problems


Psychiatric:  + substance abuse, No anxiety, No depression symptoms, No insomnia





Medications





 Current Inpatient Medications








 Medications


  (Trade)  Dose


 Ordered  Sig/Tish


 Route  Start Time


 Stop Time Status Last Admin


Dose Admin


 


 Acetaminophen


  (Tylenol Tab)  650 mg  Q4H  PRN


 PO  3/29/17 16:30


 4/28/17 16:29  3/30/17 13:38


650 MG


 


 Folic Acid


  (Folvite Tab)  1 mg  QAM


 PO  3/30/17 09:00


 4/29/17 08:59  3/31/17 07:54


1 MG


 


 Thiamine HCl


  (Vitamin B-1 Tab)  100 mg  QAM


 PO  3/30/17 09:00


 4/29/17 08:59  3/31/17 07:54


100 MG


 


 Multivitamins 1


 tab  1 tab  QAM


 PO  3/30/17 09:00


 4/29/17 08:59  3/31/17 07:53


1 TAB


 


 Sodium Chloride


  (Nss 1000ml)  1,000 ml @ 


 100 mls/hr  Q10H


 IV  3/29/17 16:39


 4/28/17 16:38  3/31/17 07:55


100 MLS/HR


 


 Lorazepam


  (Ativan Inj)  1 mg  Q1H  PRN


 IV  3/29/17 16:45


 4/28/17 16:44  3/31/17 11:03


1 MG


 


 Chlordiazepoxide


  (Librium Cap)  25 mg  Q8H


 PO  3/31/17 22:00


 4/1/17 14:01   


 


 


 Chlordiazepoxide


 10 mg  10 mg  Q12H


 PO  4/2/17 06:00


 4/2/17 18:01   


 


 


 Lorazepam/Syringe


  (Ativan Inj/


 Syringe)  1 ml @ 1


 mls/min  Q1H  PRN


 IV  3/29/17 20:30


 4/28/17 20:29  3/30/17 22:24


1 MLS/MIN


 


 Docusate Sodium


  (coLACE CAP)  100 mg  BID


 PO  3/30/17 21:00


 4/29/17 20:59  3/31/17 08:44


100 MG


 


 Senna


  (Senokot Tab)  8.6 mg  QAM


 PO  3/31/17 09:00


 4/30/17 08:59  3/31/17 07:55


8.6 MG


 


 Gabapentin


  (Neurontin Tab)  600 mg  Q8H


 PO  3/31/17 06:00


 3/31/17 22:01  3/31/17 14:52


600 MG


 


 Gabapentin


  (Neurontin Tab)  600 mg  Q12H


 PO  4/1/17 09:00


 4/1/17 21:01   


 


 


 Gabapentin


  (Neurontin Tab)  600 mg  Q24H


 PO  4/2/17 09:00


 4/2/17 09:01   


 











Objective


Vital Signs











  Date Time  Temp Pulse Resp B/P Pulse Ox O2 Delivery O2 Flow Rate FiO2


 


3/31/17 14:41 36.9 89 20 122/82 97 Room Air  


 


3/31/17 11:27 36.8 86 18 132/86 99 Room Air  


 


3/31/17 08:00      Room Air  


 


3/31/17 07:31 36.4 68 20 123/79 98 Room Air  


 


3/31/17 04:00      Room Air  


 


3/31/17 00:00      Room Air  


 


3/30/17 23:02 36.9 78 20 115/76 98 Room Air  


 


3/30/17 20:00      Room Air  


 


3/30/17 19:03 36.7 84 20 128/82 97 Room Air  


 


3/30/17 16:00      Room Air  











Physical Exam


General Appearance:  no apparent distress


Respiratory/Chest:  lungs clear, normal breath sounds, no respiratory distress, 

no accessory muscle use


Cardiovascular:  regular rate, rhythm, no edema, no murmur


Neurologic/Psychiatric:  no motor/sensory deficits, alert, normal mood/affect





Laboratory Results





Last 24 Hours








Test


  3/31/17


07:45


 


White Blood Count 5.97 K/uL 


 


Red Blood Count 4.17 M/uL 


 


Hemoglobin 13.2 g/dL 


 


Hematocrit 38.8 % 


 


Mean Corpuscular Volume 93.0 fL 


 


Mean Corpuscular Hemoglobin 31.7 pg 


 


Mean Corpuscular Hemoglobin


Concent 34.0 g/dl 


 


 


RDW Standard Deviation 44.2 fL 


 


RDW Coefficient of Variation 13.0 % 


 


Platelet Count 166 K/uL 


 


Mean Platelet Volume 10.1 fL 


 


Sodium Level 142 mmol/L 


 


Potassium Level 3.8 mmol/L 


 


Chloride Level 109 mmol/L 


 


Carbon Dioxide Level 25 mmol/L 


 


Anion Gap 8.0 mmol/L 


 


Blood Urea Nitrogen 5 mg/dl 


 


Creatinine 0.81 mg/dl 


 


Est Creatinine Clear Calc


Drug Dose 119.8 ml/min 


 


 


Estimated GFR (


American) 109.8 


 


 


Estimated GFR (Non-


American 94.8 


 


 


BUN/Creatinine Ratio 5.8 


 


Random Glucose 109 mg/dl 


 


Calcium Level 8.6 mg/dl 


 


Magnesium Level 2.1 mg/dl 











Assessment and Plan


This is a 36 year old female with PMH of depression, alcohol and tobacco abuse 

presents with intentional overdose





Intentional Overdose


Suicidal Attempt


3/31


patient is stable for discharge today to [facility]





3/30


patient intentionally took multiple pills; unknown which ones


patient is supposed to take Wellbutrin, Prozac - but does not take these


is willing to start medications for depression


plan is for her to go to the Mental Health Unit after she is medically stable


EKG obtained this morning shows a QTc ~ 450; improving from previous one





Alcohol Abuse; Withdrawal Symptoms


monitoring for DTs


currently on Librium protocol, appreciate psych input


started gabapentin as well


Ativan PRN for anxiety, withdrawal symptoms


+hallucinations on the night of 3/29; none since





DVT ppx


SCDs





FULL CODE

## 2017-03-31 NOTE — DISCHARGE SUMMARY
Discharge Summary


Date of Service


Mar 31, 2017.





Discharge Summary


Admission Date:


Mar 29, 2017 at 16:29


Discharge Date:  Mar 31, 2017


Discharge Disposition:  Acute care facility


Principal Diagnosis:


Intentional Drug Overdose


Suicide Attempt


Alcohol Abuse/Withdrawal Symptoms





Medication Reconciliation


New Medications:  


Folic Acid (Folic Acid) 1 Mg Tab


1 MG PO QAM for 30 Days, #30 TAB





Multivitamins (Daily Mckay) 1 Tab Tab


1 TAB PO QAM for 30 Days, #30 TAB





Thiamine HCl (Vitamin B-1) 100 Mg Tab


100 MG PO QAM for 30 Days, #30 TAB





 


Continued Medications:  


[Iud] ()  


1 EA PV UD











Admission Information


HPI (per Admitting provider):


Patient seen and examined. 36 year old female with PMHx of alcohol abuse, 

tobacco abuse and depression presents to the ED following an intentional 

overdose. Patient reports she was"done with life" so she took "a lot of pills." 

She states she took 33 pills a few days ago but then has been taking some more 

every day (timeline inconsistent throughout hospital stay when speaking with 

different providers). She states she does not know what these pills are just 

that they are ones lying around her house. She states she has been drinking on 

top of this. She states she will drink a 5th of mariola every 2-3 days. She 

states her last drink as 2 days ago.She states her mother was concerned and 

call an ambulance who brought her to the ED. She reports at this time she does 

not feel suicidal. She also denies homicidal ideations. She reports her first 

drink was at age 15 and drinking became a problem 5 or 10 years ago that she 

"just really likes it." She reports a stay at inpatient rehab for 22 days 

previously where she was on a Librium taper for withdrawal. She states she also 

had periods of sobriety when pregnant 5 and 10 years ago. She reports when she 

withdrawals she has diaphoresis, tremors and anxiety. She denies illicit drug 

use. She denies fevers, chills, URI symptoms, chest pain, SOB, nausea, vomiting

, diarrhea, dysuria, calf pain and edema. In the ED VS are stable, wbc count is 

mildly elevated. Tox screen is + MDMA and + benzodiazepines. She received Ativan

, IVFs and Zofran. Patient will be admitted to the internal medicine service 

with psychiatry consult.


Physical Exam (per Admitting):  


   General Appearance:  + pertinent finding (Pleasant WD/WN 36 year old female 

lying in bed in NAD with mild hand tremor )


   Head:  normocephalic, atraumatic


   Eyes:  PERRL, EOMI, sclerae normal


   ENT:  hearing grossly normal, pharynx normal


   Neck:  supple, no JVD


   Respiratory/Chest:  chest non-tender, lungs clear, normal breath sounds, no 

respiratory distress, no accessory muscle use


   Cardiovascular:  regular rate, rhythm, no edema, no gallop, no JVD, no murmur

, normal peripheral pulses


   Abdomen/GI:  normal bowel sounds, non tender, soft


   Back:  normal inspection, no muscle spasm


   Extremities/Musculoskelatal:  no calf tenderness, normal capillary refill, 

no pedal edema


   Neurologic/Psych:  alert, oriented x 3, + pertinent finding (mild tremor, 

otherwise no motor or sensory deficits. Denies suicidal and homicidal 

ideations. )


   Skin:  normal color, warm/dry, no rash


   Lymphatic:  no adenopathy





Hospital Course


This is a 36 year old female with PMH of depression, alcohol and tobacco abuse 

presents with intentional overdose





Intentional Overdose


Suicidal Attempt


3/31


patient is stable for discharge today to MHU





3/30


patient intentionally took multiple pills; unknown which ones


patient is supposed to take Wellbutrin, Prozac - but does not take these


is willing to start medications for depression


plan is for her to go to the Mental Health Unit after she is medically stable


EKG obtained this morning shows a QTc ~ 450; improving from previous one





Alcohol Abuse; Withdrawal Symptoms


monitoring for DTs


currently on Librium protocol, appreciate psych input


started gabapentin as well


Ativan PRN for anxiety, withdrawal symptoms


+hallucinations on the night of 3/29; none since





DVT ppx


SCDs





FULL CODE


Total time spent on discharge = 25 minutes


This includes examination of the patient, discharge planning, medication 

reconciliation, and communication with other providers.





Discharge Instructions


To be discharged to mental health unit


Continue Thiamine, Folate, Multivitamin


Gabapentin protocol for alcohol withdrawal symptoms

## 2017-04-01 VITALS — DIASTOLIC BLOOD PRESSURE: 75 MMHG | TEMPERATURE: 98.24 F | HEART RATE: 69 BPM | SYSTOLIC BLOOD PRESSURE: 116 MMHG

## 2017-04-01 VITALS — TEMPERATURE: 97.7 F | SYSTOLIC BLOOD PRESSURE: 131 MMHG | HEART RATE: 91 BPM | DIASTOLIC BLOOD PRESSURE: 88 MMHG

## 2017-04-01 VITALS — TEMPERATURE: 98.06 F | DIASTOLIC BLOOD PRESSURE: 80 MMHG | SYSTOLIC BLOOD PRESSURE: 124 MMHG | HEART RATE: 72 BPM

## 2017-04-01 RX ADMIN — Medication SCH MG: at 09:15

## 2017-04-01 RX ADMIN — GABAPENTIN SCH MG: 400 CAPSULE ORAL at 09:15

## 2017-04-01 RX ADMIN — MAGNESIUM HYDROXIDE PRN ML: 400 SUSPENSION ORAL at 15:07

## 2017-04-01 RX ADMIN — NICOTINE SCH PATCH: 7 PATCH, EXTENDED RELEASE TRANSDERMAL at 09:20

## 2017-04-01 RX ADMIN — FLUOXETINE SCH MG: 20 CAPSULE ORAL at 11:55

## 2017-04-01 RX ADMIN — GABAPENTIN SCH MG: 400 CAPSULE ORAL at 21:19

## 2017-04-01 RX ADMIN — Medication SCH TAB: at 09:15

## 2017-04-01 NOTE — PSYCHIATRIC HISTORY & PHYSICAL
History


Date of Service


Apr 1, 2017.





Identifying Data


Nandini Huynh is a 36-year-old female who currently lives in [place].  

Nandini Huynh was admitted on a 201 voluntary commitment.  Patient is admitted 

from transfer from the medical floor following an OD attempt and medical 

management of ETOH withdrawal.





Chief Complaint


"I'm ready for this to work".





History of Present Illness


Nandini presented to the ED on 3/29/17 after taking 33 Wellbutrin over the course 

of a few day and "other pills" as a suicide attempt.  Stressors include going 

through a divorce and having to move back into her parents' home with her 2 

sons about 6 months ago.  She admits that her drinking has created issues in a 

variety of her relationships and is ready to quit.  She notes that due to 

drinking 1/5th of mariola every 2-3 days to self medicate, she wasn't focussing 

on her health.  She wasn't sleeping well (less than 4 hours) and mainly trading 

calories for alcohol rather than food in the few days leading up to 

hospitalization.  She rates her anxiety as "always high" and feels that she has 

mood swings--a really good week alternating with "total crap" but denies 

periods of euphoria, impulsive agitation or increased goal directed activities 

that would suggest mono.  On the medical floor she did experience some 

delirium in that would awaken as if she was smoking a cigarette or 

misinterpreted sensory input.  Librium was tapered in favor of Neurontin and 

denies tremor.  VSS this am.





Past Psychiatric History


Current OP Treatment:  no current treatment


Prior OP Treatment:  no prior treatment (though 2 rehab stays)


Prior Psych Hospitalizations:  none


Access to a Gun:  No (denied)


Suicide Attempts:  No


Past Medication Trials


Prozac--20 mg, was helpful in that lost weight, mood better than now but didn't 

take consistently


Lorazepam or Librium--previously given supply for ?ETOH detox





Past Medical/Surgical History


History of Concussion/Seizure:  No





(1) Benign hypertension


(2) History of removal of ovarian cyst





Allergies


Allergies:  


Coded Allergies:  


     No Known Allergies (Verified , 3/29/17)





Home Medications


Scheduled


Bupropion (Wellbutrin-Xl), 300 MG PO DAILY


Folic Acid (Folvite), 1 MG PO DAILY


Multivitamin (Multivitamin), 1 TAB PO DAILY


Thiamine Hcl (Vitamin B-1), 100 MG PO DAILY


[Iud], 1 EA PV UD





Family History





Diabetes mellitus


FH: cancer


FH: heart disease


Hypertension


History of Suicide:  No


brother schizophrenia





Alcohol Use


Alcohol Use In Past 12 Months:  Yes (a fifth of mariola every 2-3 days, 5 days 

ago)


AUDIT Total Score:  23


The patient's AUDIT score suggests problematic drinking (Zone III WHO).  Brief 

intervention was offered and accepted.


Intervention (if performed) was greater than 5 min in length.





Brief interventions include: 1. Assess Readiness to Quit, 2. Advise: Help 

Patient to Reduce or Abstain from Alcohol, 3. Agree: Set Specific, Feasible 

Goals, 4. Assist: Anticipate barriers, Problem-Solving Solutions.  Social work 

to 5. Arrange: Referrals to appropriate treatment.  





Summary of intervention: The patient is in action stage with regards to 

transtheoretical model of change.  The patient is advised to decrease alcohol 

consumption due to depressant effects and risk of interactions with 

prescription medications.  The patient agreed to IOP referral and will be 

provided with recovery materials to continue to education self on how to cope 

with their condition without drinking.





Smoking Use


Smoking Status:  Current Every Day Smoker


patient is not interested in quitting smoking at this time but she was open to 

tobacco cessation counseling as desires patch/gum here on unit and accepted 

"myths and facts" worksheet as well as tips, discussion was >5 min in length.





Substance History


urine tox positive for benzos and MJ.


queried  [employer], only history 2016 Librium prescription





Personal History


Lives in:  [place]


Education:  graduated from high school


Work History:


retail--parttime at 2 stores


Relationship History:  


Children:  2 boys (5 and 10)


Legal History:  none


Psychological Trauma History:  Other (denied)





Review of Systems


Psych:  denies symptoms other than stated above


Constitutional: fatigue


Cardiovascular: denied


GI: denied


Neurologic: denied


Remainder of 10 body systems also reviewed and denied other than noted above.





Examination


Physical Examination


A physical exam was performed in the medical floor prior to admission to the 

unit.  I accept that physical as correct/medical clearance for the inpatient 

physical exam.





Vital Signs





 Vital Signs Past 12 Hours








  Date Time  Temp Pulse Resp B/P Pulse Ox O2 Delivery O2 Flow Rate FiO2


 


4/1/17 08:57 36.8 69 18 116/75    


 


4/1/17 06:50 36.5 78 18 131/87    





  91  129/88    


 


4/1/17 00:32 36.7 72 18 124/80    











Laboratory Results


ordered fasting glu and lipids for am as metabolic screening.





Mental Examination


During interview pt is:  alert and oriented


Appearance:  appropriately dressed, appropriately groomed


Eye contact is:  good


Motor behavior is:  no abnormal motor movements


Speech:  normal in rate, rhythm & volume


Affect:  mood congruent


Mood is:  depressed, anxious


Thought process:  clear, coherent


Thought content:  cognitive distortions


Suicidal thought are:  denied


Homicidal thoughts are:  denied


Hallucinations:  denies auditory, denies visual


Cognition:  memory grossly intact, attention grossly intact, language grossly 

intact


Intelligence estimated to be:  consistent with level of education


Insight:  limited


Judgement:  limited





Impression / Recommendations


Impression


35 yo female with history of depression and ETOH dependence who presents 

following OD attempt.





Inventory Assets


Strengths:


employed, stable housing, relationship with children


Needs:


outpatient treatment





Risk Factors Assessment


:  Yes


/single/:  Yes


Substance use disorders:  Yes


Previous attempt:  No


Previous psychiatric stay:  No





Protective Factors Assessment


Responsible for young children:  Yes


Employed:  Yes


Supportive family:  Yes





Recommendations





(1) Major depressive disorder, recurrent episode with anxious distress


4/1--The patient is admitted to [facility] ([facility] mental health unit) on 

q 15 min checks (behavioral with suicide precautions) for safety.  The patient 

will participate in group, recreational and milieu therapies and will be 

offered additional individual and family sessions as clinically appropriate.  

She is agreeable to restart Prozac at 20 mg for 2 days and likely increase.  

Reviewed that benzodiazepines would not be recommended as requesting adjunct 

for anxiety and risks/benefits/alternatives also reviewed re: Buspar 10 mg BID (

am and afternoon), may increase to TID.





(2) EtOH dependence


4/1/17--brief intervention as above, IOP recommended.  Continuing Neurontin 

taper and on AWSS protocol.





(3) Nicotine dependence


Brief counseling as above.  Readdress cessation counseling upon discharge.








CPT Code


Initial Hospital Care:  38711

## 2017-04-02 VITALS — SYSTOLIC BLOOD PRESSURE: 111 MMHG | TEMPERATURE: 98.24 F | HEART RATE: 89 BPM | DIASTOLIC BLOOD PRESSURE: 79 MMHG

## 2017-04-02 VITALS — DIASTOLIC BLOOD PRESSURE: 76 MMHG | HEART RATE: 105 BPM | SYSTOLIC BLOOD PRESSURE: 119 MMHG | TEMPERATURE: 97.88 F

## 2017-04-02 VITALS — DIASTOLIC BLOOD PRESSURE: 72 MMHG | SYSTOLIC BLOOD PRESSURE: 110 MMHG | TEMPERATURE: 98.42 F | HEART RATE: 77 BPM

## 2017-04-02 LAB
CHOLEST/HDLC SERPL: 2.1 {RATIO}
GLUCOSE UR QL: 60 MG/DL
KETONES UR QL STRIP: 44 MG/DL
NITRITE UR QL STRIP: 114 MG/DL (ref 0–150)
PH UR: 127 MG/DL (ref 0–200)
VERY LOW DENSITY LIPOPROT CALC: 23 MG/DL

## 2017-04-02 RX ADMIN — FLUOXETINE SCH MG: 20 CAPSULE ORAL at 08:44

## 2017-04-02 RX ADMIN — Medication SCH TAB: at 08:43

## 2017-04-02 RX ADMIN — Medication SCH MG: at 08:44

## 2017-04-02 RX ADMIN — NICOTINE SCH PATCH: 7 PATCH, EXTENDED RELEASE TRANSDERMAL at 08:47

## 2017-04-02 NOTE — PSYCHIATRIC PROGRESS NOTES
Progress Note


Date of Service


Apr 2, 2017.





Interval History


37 yo female admitted voluntarily from the medical floor where she had been 

admitted for alcohol withdrawal and OD in a suicide attempt.





Chief Complaint


"Better than yesterday.".





Subjective


Patient was seen & assessed interval progress reviewed with Treatment Team.  

The patient says that she had "a breakdown" yesterday.  She was feeling badly 

about her drinking and the impact it had on her family, and was having cravings 

to drink and smoke.  She was able to process with staff and felt better in 

doing so.  "I don't want to fail this time", feeling that she failed after her 

2 previous rehabs.  She says "I reached my bottom" and wants to be able to put 

alcohol behind her.  Her parents drink and so there is alcohol in their home, 

and she is not sure if they would be willing to remove it while she lives 

there.  Is willing for a family meeting to discuss this.  She talked about the 

relationships in her life that she has lost based on her drinking because "I 

get crazy, mean" when she drinks.  She denies any further SI saying that taking 

the 33 wellbutrin pills was the only time in her life that she had done that, 

and was under the influence at the time.  She wants to go to [facility], get a sponsor 

and work on changing her life.





Review of Systems


Constitutional:  No chills, No fatigue, No fever, No problem reported, No sweats

, No weakness, No weight loss


ENT:  No dental problems, No hearing loss, No nasal symptoms, No problem 

reported, No sore throat, No tinnitus, No trouble swallowing, No unusual 

epistaxis


Respiratory:  No cough, No dyspnea at rest, No dyspnea on exertion, No 

hemoptysis, No problem reported, No shortness of breath, No sputum, No wheezing


Cardiovascular:  No PND, No chest pain, No claudication, No edema, No orthopnea

, No palpitations, No problem reported


Abdomen:  No GI bleeding, No constipation, No diarrhea, No nausea, No pain, No 

problem reported, No vomiting


Musculoskeletal:  No calf pain, No joint pain, No muscle pain, No problem 

reported, No swelling


Neurologic:  No balance problems, No memory loss, No numbness/tingling, No 

paralysis, No problem reported, No vertigo, No weakness


Psychiatric:  + depression symptoms, + substance abuse (with cravings)





Sleep Information


Total Hours of Sleep:  7.75





Meal Information


Percent of Breakfast Consumed:  100


Percent of Lunch Consumed:  100


Percent of Dinner Consumed:  100





Mental Status Exam


During interview pt is:  alert and oriented


Appearance:  appropriately dressed, appropriately groomed


Eye contact is:  good


Motor behavior is:  no abnormal motor movements


Speech:  normal in rate, rhythm & volume


Affect:  mood congruent


Mood is:  depressed, anxious


Thought process:  clear, coherent


Thought content:  cognitive distortions


Suicidal thought are:  denied


Homicidal thoughts are:  denied


Hallucinations:  denies auditory, denies visual


Cognition:  memory grossly intact, attention grossly intact, language grossly 

intact


Intelligence estimated to be:  consistent with level of education


Insight:  limited


Judgement:  limited





Impression


Patient adjusting to the unit.   Is beginning to look at the impact of her 

alcohol use on her family, and feels motivated to change.  Does not want to 

return to rehab saying that the environment was no good for change, but wants 

IOP, and will make referral tomorrow.  Will need a family meeting with parents 

as well.  Not triggering on AWSS, and feels that the buspar is helping her 

anxiety to will continue current meds.





Plan





(1) Major depressive disorder, recurrent episode with anxious distress


4/1--The patient is admitted to [facility] ([facility] mental health unit) on 

q 15 min checks (behavioral with suicide precautions) for safety.  The patient 

will participate in group, recreational and milieu therapies and will be 

offered additional individual and family sessions as clinically appropriate.  

She is agreeable to restart Prozac at 20 mg for 2 days and likely increase.  

Reviewed that benzodiazepines would not be recommended as requesting adjunct 

for anxiety and risks/benefits/alternatives also reviewed re: Buspar 10 mg BID (

am and afternoon), may increase to TID.


4/2


   - Schedule family meeting


   - Continue current meds





(2) EtOH dependence


4/1/17--brief intervention as above, IOP recommended.  Continuing Neurontin 

taper and on AWSS protocol.





4/2


   - Recovery protocol


   - Refer for IOP





(3) Nicotine dependence


Brief counseling as above.  Readdress cessation counseling upon discharge.








Discharge / Aftercare Planning


Primary Care Physician:  


   Name:  Dr. Maria


Therapist:  


   Name:  none


:  


   Name:  none





Visit Code


E&M Code:  73528





Inventory Assets


Strengths:


employed, stable housing, relationship with children


Needs:


outpatient treatment





Risk Factors Assessment


:  Yes


/single/:  Yes


Substance use disorders:  Yes


Previous attempt:  No


Previous psychiatric stay:  No





Protective Factors Assessment


:  No


Responsible for young children:  Yes


Employed:  Yes


Stable relationships:  No


Supportive family:  Yes





Data


Vital Signs Last 24 Hrs:











  Date Time  Temp Pulse Resp B/P Pulse Ox O2 Delivery O2 Flow Rate FiO2


 


4/2/17 08:38 36.8 89 18 111/79    


 


4/2/17 06:51 36.9 77 18 107/72    





  91  110/78    








Meds Administered Last 24 Hrs:





Meds Administered (Past 24Hrs)








 Medications


  (Trade)  Dose


 Ordered  Sig/Tish


 Route  Start Time


 Stop Time Status Last Admin


Dose Admin


 


 Magnesium


 Hydroxide


  (Milk Of


 Magnesia Susp)  30 ml  DAILY  PRN


 PO  3/31/17 18:30


 4/30/17 18:29  4/1/17 15:07


30 ML


 


 Hydroxyzine HCl


  (Vistaril Tab)  50 mg  HSZ  PRN


 PO  3/31/17 18:30


 4/30/17 18:29  4/1/17 21:19


50 MG


 


 Folic Acid


  (Folvite Tab)  1 mg  DAILY


 PO  4/1/17 09:00


 5/1/17 08:59  4/2/17 08:43


1 MG


 


 Multivitamins


  (Multivitamin


 Tab)  1 tab  DAILY


 PO  4/1/17 09:00


 5/1/17 08:59  4/2/17 08:43


1 TAB


 


 Thiamine HCl


  (Vitamin B-1 Tab)  100 mg  DAILY


 PO  4/1/17 09:00


 5/1/17 08:59  4/2/17 08:44


100 MG


 


 Lorazepam


  (Ativan Tab)  1 mg  ONE  PRN


 PO  3/31/17 19:15


 3/31/17 20:00 DC 3/31/17 20:00


1 MG


 


 Gabapentin


  (Neurontin Cap)  400 mg  TODAY@2200


 PO  3/31/17 22:00


 3/31/17 22:01 DC 3/31/17 21:10


400 MG


 


 Gabapentin


  (Neurontin Cap)  400 mg  Q12H


 PO  4/1/17 09:00


 4/1/17 21:01 DC 4/1/17 21:19


400 MG


 


 Gabapentin


  (Neurontin Cap)  400 mg  Q24H


 PO  4/2/17 09:00


 4/2/17 09:01 DC 4/2/17 08:43


400 MG


 


 Nicotine


  (Nicoderm Cq 7


 Mg Patch)  1 patch  QAM


 TD  4/1/17 09:00


 5/1/17 08:59  4/2/17 08:47


1 PATCH


 


 Nicotine


  (Nicoderm Cq 7


 Mg Patch)  1 patch  NOW  ONCE


 TD  3/31/17 21:00


 3/31/17 21:01 DC 3/31/17 21:11


1 PATCH


 


 Fluoxetine HCl


  (Prozac Cap)  20 mg  QAM


 PO  4/1/17 11:00


 5/1/17 10:59  4/2/17 08:44


20 MG


 


 Buspirone HCl


  (Buspar Tab)  10 mg  BID17


 PO  4/1/17 17:00


 5/1/17 16:59  4/2/17 08:43


10 MG








Lab Results Last 24 Hrs:





Last 24 Hours








Test


  4/2/17


07:00


 


Fasting Glucose 100 mg/dl 


 


Triglycerides Level 114 mg/dl 


 


Cholesterol Level 127 mg/dl 


 


HDL Cholesterol 60 mg/dl 


 


LDL Cholesterol, Calculated 44 mg/dl 


 


VLDL Cholesterol, Calculated 23 mg/dl 


 


Cholesterol/HDL Ratio 2.1

## 2017-04-03 VITALS — TEMPERATURE: 97.88 F | SYSTOLIC BLOOD PRESSURE: 89 MMHG | HEART RATE: 73 BPM | DIASTOLIC BLOOD PRESSURE: 64 MMHG

## 2017-04-03 VITALS — SYSTOLIC BLOOD PRESSURE: 109 MMHG | DIASTOLIC BLOOD PRESSURE: 76 MMHG | HEART RATE: 83 BPM | TEMPERATURE: 97.88 F

## 2017-04-03 LAB
HYDROXYETHYLFLURAZEPAM CONF: NEGATIVE NG/ML
HYDROXYMIDAZOLAM: NEGATIVE NG/ML
HYDROXYTRIAZOLAM CONF: NEGATIVE NG/ML
TEMAZEPAM UR-MCNC: NEGATIVE NG/ML

## 2017-04-03 RX ADMIN — FLUOXETINE SCH MG: 20 CAPSULE ORAL at 08:57

## 2017-04-03 RX ADMIN — NICOTINE SCH PATCH: 7 PATCH, EXTENDED RELEASE TRANSDERMAL at 08:58

## 2017-04-03 RX ADMIN — Medication SCH TAB: at 08:57

## 2017-04-03 RX ADMIN — MAGNESIUM HYDROXIDE PRN ML: 400 SUSPENSION ORAL at 09:33

## 2017-04-03 RX ADMIN — Medication SCH MG: at 08:57

## 2017-04-03 NOTE — PSYCHIATRIC PROGRESS NOTES
Progress Note


Date of Service


Apr 3, 2017.





Interval History


35 yo female admitted voluntarily from the medical floor where she had been 

admitted for alcohol withdrawal and OD in a suicide attempt.





Chief Complaint


"I've been great".





Subjective


Patient was seen & assessed interval progress reviewed with Treatment Team. 

Staff report she is going to groups and participating, discussing her emotions 

that are coming out since she's stopped drinking. She is no longer scoring on 

AWSS. She has a meeting with her parents today and has talked about her 

difficulties in dealing with her own alcoholism as her parents are alcoholics. 

She says that her mood has improved throughout the course of her stay, and her 

withdrawal has resolved.  She states that "for the first 2 days, the withdrawal 

was terrible," and she had shakes, sweats, and hallucinations.  She says that 

she is committed to sobriety, saying "this time I really want to stop drinking, 

I want to do intensive outpatient and AA."  She does not want to go to 

inpatient rehabilitation, stating that "it's a joke, too many users, not enough 

alcoholics."  She denies suicidal thoughts, stating that her overdose was "just 

my way of showing everybody I was rock-bottom with my drinking, just didn't 

know how to stop drinking."  She is tearful when discussing the family meeting 

with her parents that is scheduled for today, stating that she is asked them to 

stop drinking or to remove alcohol from their home before, and they were never 

willing to do it.  She talked to them about this over the weekend, and states 

they agreed to take all the alcohol out of the home and to try to support her 

in her recovery.  She states that her children live with them as well, and 

thinks this is a motivating factor for them in removing the alcohol.  She 

denies any side effects to her current medications, and states that treatment 

here is helping her.  She states that she saw her OB/GYN prior to admission and 

was diagnosed with bacterial vaginosis and given a prescription for 

metronidazole, which she did not fill as she was then admitted to the hospital.

  She continues to have fishy smelling vaginal discharge, and is requesting to 

take oral metronidazole here.





Review of Systems


Constipation





Sleep Information


Total Hours of Sleep:  6.50





Meal Information


Percent of Breakfast Consumed:  80


Percent of Lunch Consumed:  100


Percent of Dinner Consumed:  100





Mental Status Exam


During interview pt is:  alert and oriented, cooperative


Appearance:  appropriately dressed, appropriately groomed, other (overweight, 

dressed in pajamas, hair disheveled)


Eye contact is:  good


Motor behavior is:  no abnormal motor movements


Speech:  normal in rate, rhythm & volume


Affect:  mood congruent, other (full, appropriate, tearful when discussing 

family meeting)


Mood is:  anxious (but improved from admission)


Thought process:  goal directed, clear, coherent


Thought content:  reality based without delusions


Suicidal thought are:  denied


Homicidal thoughts are:  denied


Hallucinations:  denies auditory, denies visual


Cognition:  memory grossly intact, attention grossly intact, language grossly 

intact


Intelligence estimated to be:  consistent with level of education


Insight:  limited


Judgement:  limited





Impression


Patient adjusting to the unit, participating in treatment, and is beginning to 

look at the impact of her alcohol use on her family, and feels motivated to 

change.  Does not want to return to rehab saying that the environment was not 

good for her, but is willing to attend IOP and AA, and will make referrals.  

She will have a family meeting with parents today as well.  Alcohol withdrawal 

has resolved, she is tolerating fluoxetine and buspar well, and thinks they are 

helping her anxiety.





Plan





(1) Major depressive disorder, recurrent episode with anxious distress


4/1--The patient is admitted to [facility] ([facility] inpatient mental health unit) on 

q 15 min checks (behavioral with suicide precautions) for safety.  The patient 

will participate in group, recreational and milieu therapies and will be 

offered additional individual and family sessions as clinically appropriate.  

She is agreeable to restart Prozac at 20 mg for 2 days and likely increase.  

Reviewed that benzodiazepines would not be recommended as requesting adjunct 

for anxiety and risks/benefits/alternatives also reviewed re: Buspar 10 mg BID (

am and afternoon), may increase to TID.





4/2


- Schedule family meeting


- Continue current meds





4/3


-Family meeting with parents today.


-Continue fluoxetine 20 mg daily and buspirone 10 mg 2 times a day.


-Refer for outpatient mental health treatment.





(2) EtOH dependence


4/1/17--brief intervention as above, IOP recommended.  Continuing Neurontin 

taper and on AWSS protocol.





4/2


- Recovery protocol


- Refer for IOP and provide information about local AA meetings.





4/3


- Discuss ways to support sobriety and family meeting.


- No longer having alcohol withdrawal symptoms, so will discontinue AWSS 

protocol. 





(3) Nicotine dependence


Brief counseling as above.  Offer nicotine replacement here.  Readdress 

cessation counseling upon discharge.





(4) Bacterial vaginosis


4/3


-Diagnosed with bacterial vaginosis by OB/GYN prior to admission, and did not 

fill prescription for metronidazole.  Continues to have symptoms here, and is 

requesting oral metronidazole, so will give a 1 time dose of 2 g today.  Will 

need to follow-up with OB/GYN is recommended after discharge. 








Discharge / Aftercare Planning


Primary Care Physician:  


   Name:  Dr. Maria


Therapist:  


   Name:  none


:  


   Name:  none





Visit Code


E&M Code:  12523





Inventory Assets


Strengths:


employed, stable housing, relationship with children


Needs:


outpatient treatment





Risk Factors Assessment


:  Yes


/single/:  Yes


Substance use disorders:  Yes


Previous attempt:  No


Previous psychiatric stay:  No





Protective Factors Assessment


:  No


Responsible for young children:  Yes


Employed:  Yes


Stable relationships:  No


Supportive family:  Yes





Data


Vital Signs Last 24 Hrs:











  Date Time  Temp Pulse Resp B/P Pulse Ox O2 Delivery O2 Flow Rate FiO2


 


4/3/17 09:02 36.6 83 16 109/76    


 


4/3/17 07:02 36.6 73 18 91/64    





  88  89/58    


 


4/2/17 12:36 36.6 105 18 119/76    








Meds Administered Last 24 Hrs:





Meds Administered (Past 24Hrs)








 Medications


  (Trade)  Dose


 Ordered  Sig/Tish


 Route  Start Time


 Stop Time Status Last Admin


Dose Admin


 


 Gabapentin


  (Neurontin Cap)  400 mg  Q24H


 PO  4/2/17 09:00


 4/2/17 09:01 DC 4/2/17 08:43


400 MG


 


 Fluoxetine HCl


  (Prozac Cap)  20 mg  QAM


 PO  4/1/17 11:00


 5/1/17 10:59  4/3/17 08:57


20 MG


 


 Buspirone HCl


  (Buspar Tab)  10 mg  BID17


 PO  4/1/17 17:00


 5/1/17 16:59  4/3/17 08:57


10 MG

## 2017-04-04 VITALS — SYSTOLIC BLOOD PRESSURE: 109 MMHG | HEART RATE: 69 BPM | TEMPERATURE: 98.24 F | DIASTOLIC BLOOD PRESSURE: 66 MMHG

## 2017-04-04 RX ADMIN — FLUOXETINE SCH MG: 20 CAPSULE ORAL at 08:06

## 2017-04-04 RX ADMIN — Medication SCH MG: at 08:07

## 2017-04-04 RX ADMIN — Medication SCH TAB: at 08:06

## 2017-04-04 RX ADMIN — NICOTINE SCH PATCH: 7 PATCH, EXTENDED RELEASE TRANSDERMAL at 08:07

## 2017-04-04 NOTE — DISCHARGE INSTRUCTIONS
Discharge Information


Report Includes


Report will include the:  Discharge Instructions & Summary





Admission


Admission Date / Time:  Mar 31, 2017 at 17:40


Reason for Admission:  Depressive Disorder Nos





Discharge


Discharge Diagnosis / Problem:  Alcohol dependence, depression


Condition at Discharge:  Good





Discharge Goals


Goal(s):  Decrease discomfort, Improve disease control, Prevent Disease 

Progression





Activity Recommendations


Activity Limitations:  resume your previous activity





.





Instructions / Follow-Up


Instructions / Follow-Up


.





SPECIAL CARE INSTRUCTIONS:





1.  Follow through with your scheduled aftercare appointments.  If unable to 

keep an appointment, please call to reschedule.





2.  Take your medication only as prescribed.  Medication should not be changed 

or stopped without the approval of your doctor.  In the event of worsening 

symptoms or concerns about side effects, contact your doctor immediately.





3.  Utilize new healthy coping skills, anger management skills, and stress 

management skills learned during your hospitalization.  Journal feelings and 

process them with a support person.  Identify stressors or situations that may 

result in relapse, deterioration or inappropriate behaviors and develop a plan 

to deal with those issues.





4.  If your coping skills are ineffective and you are in crisis, contact your 

outpatient providers for direction.  If unable to reach your providers, please 

call the  CAN HELP LINE AT [phone] or go to the closest Emergency Room.





5.  Avoid alcohol and un-prescribed drugs.





6.  You have been provided with the Mental Health Advance Directives Pamphlet 

for your review.








AFTERCARE APPOINTMENTS: 





*  Please call your insurance company prior to your scheduled appointment to 

confirm your aftercare providers are covered.  Take your insurance information 

to your appointments.





.





Discharge / Aftercare Planning


Primary Care Physician:  


   Name:  Dr. Maria


   Phone Number:  [phone]


   Appointment Notes:  as needed


Psychiatrist:  


   Name:  referral pending through [facility]- pt must be invested 

in tx


   Phone Number:  [phone]


   Date of Appointment:  Apr 20, 2017


   Time of Appointment:  1130


   Appointment Notes:  intake at [address] [place] [address] (above [facility])


Therapist:  


   Name Of Therapist:  [facility] intake


   Phone Number:  [phone]


   Date of Appointment:  Apr 20, 2017


   Time of Appointment:  1130


:  


   Name:  none


Other:  


   Name of Appointment #1:  [facility] -IOP Intake


   Phone Number:  [phone]


   Date of Appointment #1:  Apr 20, 2017


   Time of Appointment #1:  1130





.





Follow-Up Care


Plan for Follow-Up Care:


the patient will be referred to [place] for IOP including therapy and 

psychiatry





Current Hospital Diet


Patient's current hospital diet: Regular Diet





Discharge Diet


Recommended Diet:  Regular Diet





Procedures


Procedures Performed:  No





Pending Studies


Pending Studies at Discharge:  No





Medical Emergencies








.


Who to Call and When:





Medical Emergencies:  


For questions or emergencies related to your hospital stay, please contact the 

Inpatient Behavioral Health Unit at [phone].





A psychiatric clinician is on-call 24/7 for the Behavioral Health Unit for 

emergencies





At any time you feel your situation is an emergency, you may also call 911 

immediately.





.





Non-Emergent Contact


Non-Emergency issues call your:  Primary Care Provider, Psychiatrist, Therapist





Advance Directives


Do You Have an Existing Mental:  No


Existing Living Will:  No


Existing Power of :  No


Advance Directives Info Given:  To Pt/S.O.


Advance Directives Reason:


Declines as Mental Health Visit.





Discharge Summary


Admission HPI


Per the Admitting provider:


Nandini presented to the ED on 3/29/17 after taking 33 Wellbutrin over the course 

of a few day and "other pills" as a suicide attempt.  Stressors include going 

through a divorce and having to move back into her parents' home with her 2 

sons about 6 months ago.  She admits that her drinking has created issues in a 

variety of her relationships and is ready to quit.  She notes that due to 

drinking 1/5th of mariola every 2-3 days to self medicate, she wasn't focussing 

on her health.  She wasn't sleeping well (less than 4 hours) and mainly trading 

calories for alcohol rather than food in the few days leading up to 

hospitalization.  She rates her anxiety as "always high" and feels that she has 

mood swings--a really good week alternating with "total crap" but denies 

periods of euphoria, impulsive agitation or increased goal directed activities 

that would suggest mono.  On the medical floor she did experience some 

delirium in that would awaken as if she was smoking a cigarette or 

misinterpreted sensory input.  Librium was tapered in favor of Neurontin and 

denies tremor.  VSS this am.





Hospital Course





(1) Major depressive disorder, recurrent episode with anxious distress


4/1--The patient is admitted to [facility] (locked inpatient mental health unit) on 

q 15 min checks (behavioral with suicide precautions) for safety.  The patient 

will participate in group, recreational and milieu therapies and will be 

offered additional individual and family sessions as clinically appropriate.  

She is agreeable to restart Prozac at 20 mg for 2 days and likely increase.  

Reviewed that benzodiazepines would not be recommended as requesting adjunct 

for anxiety and risks/benefits/alternatives also reviewed re: Buspar 10 mg BID (

am and afternoon), may increase to TID.





4/2


- Schedule family meeting


- Continue current meds





4/3


-Family meeting with parents today.


-Continue fluoxetine 20 mg daily and buspirone 10 mg 2 times a day.


-Refer for outpatient mental health treatment.





(2) EtOH dependence


4/1/17--brief intervention as above, IOP recommended.  Continuing Neurontin 

taper and on AWSS protocol.





4/2


- Recovery protocol


- Refer for IOP and provide information about local AA meetings.





4/3


- Discuss ways to support sobriety and family meeting.


- No longer having alcohol withdrawal symptoms, so will discontinue AWSS 

protocol. 





(3) Nicotine dependence


Brief counseling as above.  Offer nicotine replacement here.  Readdress 

cessation counseling upon discharge.





(4) Bacterial vaginosis


4/3


-Diagnosed with bacterial vaginosis by OB/GYN prior to admission, and did not 

fill prescription for metronidazole.  Continues to have symptoms here, and is 

requesting oral metronidazole, so will give a 1 time dose of 2 g today.  Will 

need to follow-up with OB/GYN is recommended after discharge. 








Risk Factors Assessment


:  Yes


/single/:  Yes


Substance use disorders:  Yes


Previous attempt:  No


Previous psychiatric stay:  No





Protective Factors Assessment


:  No


Responsible for young children:  Yes


Employed:  Yes


Stable relationships:  No


Supportive family:  Yes





Day of Discharge Assessment


COURSE OF HOSPITALIZATION: The patient is a 36-year-old woman with known 

dependence, who was initially admitted to the medical floor for alcohol 

withdrawal.  She was cleared and brought to mental health after that.  She 

remained on JOHANNY S protocol for alcohol withdrawal but triggered very little.  

This was eventually discontinued.  She did have cravings throughout her stay, 

especially when under stress.  She denied that she was having any further 

suicidal thoughts and says that she only has those when she is under the 

influence of alcohol and she only overdosed on medicines once and that was 

prior to admission.  She had a family meeting with her parents, with whom she 

lives, and all are agreed to support her in her desire for sobriety.  Patient's 

parents are willing to remove alcohol from the home and lock it up in a shed.  

The patient feels like she is finally at a point where she wants to conquer her 

dependence and remain sober and feels that this is very different from the 2 

times she had previously gone to rehabilitation.  She was struggling during her 

stay, to realize the impact that her alcoholism has had on her own children and 

her parents and her relationships with peers.  She is agreeable to doing 90 

meetings in 90 days and to participate in an intensive outpatient program.  She 

declined rehabilitation, saying that the last 2 times she was there was not a 

productive environment.





DAY OF DISCHARGE ASSESSMENT: Today the patient is requesting discharge.  She 

feels that remaining in the hospital will not benefit her any more, that she 

has learned some additional coping strategies and would like to go home and put 

them in place.  [facility] and able to 

return home to care for her children.  She feels good about the support that 

she is getting from her parents, but recognizes that the work is all hers.  She 

continues to deny suicidal or homicidal ideation, and has no further symptoms 

of alcohol withdrawal other than cravings.  We did discuss the use of 

naltrexone to treat cravings for alcohol and it was suggested that she take 

this up with her outpatient psychiatrist.  Today she is casually and 

appropriately dressed and groomed.  Gait and station are within normal limits.  

Eye contact is good.  Affect is restricted but able to smile.  Speech is of 

normal rate volume and tone.  Thoughts are organized and goal-directed, without 

evidence of thought disorder.  Recent and remote memory are intact per 

conversation.  Intelligence is estimated to be average.  Insight and judgment 

are improved over admission.





Laboratory











Test


  4/2/17


07:00


 


Fasting Glucose 100 


 


Triglycerides Level 114 


 


Cholesterol Level 127 


 


HDL Cholesterol 60 


 


LDL Cholesterol, Calculated 44 


 


VLDL Cholesterol, Calculated 23 


 


Cholesterol/HDL Ratio 2.1 











Total Time


Total Time Spent (min):  Greater than 30 minutes


Total Time Included:  examination of the patient, discharge planning, 

medication reconciliation, communication with other providers





Tobacco Cessation at Discharge


Smoking Status:  Current Every Day Smoker


FDA approved Prescription:  declined med & out pt counseling

## 2017-12-12 ENCOUNTER — HOSPITAL ENCOUNTER (EMERGENCY)
Dept: HOSPITAL 45 - C.EDA | Age: 37
LOS: 1 days | Discharge: HOME | End: 2017-12-13
Payer: COMMERCIAL

## 2017-12-12 ENCOUNTER — HOSPITAL ENCOUNTER (OUTPATIENT)
Dept: HOSPITAL 45 - C.LAB | Age: 37
Discharge: HOME | End: 2017-12-12
Attending: GENERAL ACUTE CARE HOSPITAL
Payer: COMMERCIAL

## 2017-12-12 VITALS
WEIGHT: 194.01 LBS | WEIGHT: 194.01 LBS | HEIGHT: 69.02 IN | HEIGHT: 69.02 IN | BODY MASS INDEX: 28.73 KG/M2 | BODY MASS INDEX: 28.73 KG/M2

## 2017-12-12 DIAGNOSIS — Z87.442: ICD-10-CM

## 2017-12-12 DIAGNOSIS — Z98.890: ICD-10-CM

## 2017-12-12 DIAGNOSIS — F17.200: ICD-10-CM
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
## 2017-12-12 DIAGNOSIS — S12.500A: ICD-10-CM

## 2017-12-12 DIAGNOSIS — I10: ICD-10-CM

## 2017-12-12 DIAGNOSIS — F10.10: Primary | ICD-10-CM

## 2017-12-12 DIAGNOSIS — S12.600A: ICD-10-CM
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
## 2017-12-12 DIAGNOSIS — Z02.83: Primary | ICD-10-CM

## 2017-12-12 DIAGNOSIS — S40.812A: ICD-10-CM

## 2017-12-12 DIAGNOSIS — Z82.49: ICD-10-CM
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
## 2017-12-12 DIAGNOSIS — Z83.3: ICD-10-CM

## 2017-12-12 LAB
ALP SERPL-CCNC: 51 U/L (ref 45–117)
ALT SERPL-CCNC: 15 U/L (ref 12–78)
ANION GAP SERPL CALC-SCNC: 8 MMOL/L (ref 3–11)
AST SERPL-CCNC: 15 U/L (ref 15–37)
BUN SERPL-MCNC: 5 MG/DL (ref 7–18)
BUN/CREAT SERPL: 6 (ref 10–20)
CALCIUM SERPL-MCNC: 9.4 MG/DL (ref 8.5–10.1)
CHLORIDE SERPL-SCNC: 108 MMOL/L (ref 98–107)
CO2 SERPL-SCNC: 26 MMOL/L (ref 21–32)
CREAT CL PREDICTED SERPL C-G-VRATE: 120.6 ML/MIN
CREAT SERPL-MCNC: 0.77 MG/DL (ref 0.6–1.2)
GLUCOSE SERPL-MCNC: 108 MG/DL (ref 70–99)
POTASSIUM SERPL-SCNC: 4.1 MMOL/L (ref 3.5–5.1)
SODIUM SERPL-SCNC: 142 MMOL/L (ref 136–145)
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
## 2017-12-13 VITALS
TEMPERATURE: 96.98 F | DIASTOLIC BLOOD PRESSURE: 105 MMHG | OXYGEN SATURATION: 98 % | SYSTOLIC BLOOD PRESSURE: 124 MMHG | HEART RATE: 98 BPM

## 2017-12-13 LAB
BASOPHILS # BLD: 0.05 K/UL (ref 0–0.2)
BASOPHILS NFR BLD: 0.7 %
COMPLETE: YES
EOSINOPHIL NFR BLD AUTO: 286 K/UL (ref 130–400)
HCT VFR BLD CALC: 48.3 % (ref 37–47)
IG%: 0.1 %
IMM GRANULOCYTES NFR BLD AUTO: 34.2 %
LYMPHOCYTES # BLD: 2.54 K/UL (ref 1.2–3.4)
MCH RBC QN AUTO: 33.1 PG (ref 25–34)
MCHC RBC AUTO-ENTMCNC: 35 G/DL (ref 32–36)
MCV RBC AUTO: 94.7 FL (ref 80–100)
MONOCYTES NFR BLD: 7.3 %
NEUTROPHILS # BLD AUTO: 1.3 %
NEUTROPHILS NFR BLD AUTO: 56.4 %
PMV BLD AUTO: 10.4 FL (ref 7.4–10.4)
PREG INTERNAL NEGATIVE QC: (no result)
PREG INTERNAL POSITIVE QC: (no result)
RBC # BLD AUTO: 5.1 M/UL (ref 4.2–5.4)
WBC # BLD AUTO: 7.42 K/UL (ref 4.8–10.8)
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
## 2018-01-06 ENCOUNTER — HOSPITAL ENCOUNTER (EMERGENCY)
Dept: HOSPITAL 45 - C.EDA | Age: 38
LOS: 1 days | Discharge: HOME | End: 2018-01-07
Payer: COMMERCIAL

## 2018-01-06 VITALS
WEIGHT: 185.19 LBS | BODY MASS INDEX: 26.51 KG/M2 | BODY MASS INDEX: 26.51 KG/M2 | HEIGHT: 70 IN | WEIGHT: 185.19 LBS | HEIGHT: 70 IN

## 2018-01-06 VITALS — TEMPERATURE: 99.86 F

## 2018-01-06 DIAGNOSIS — I10: ICD-10-CM

## 2018-01-06 DIAGNOSIS — Z80.9: ICD-10-CM

## 2018-01-06 DIAGNOSIS — F17.200: ICD-10-CM

## 2018-01-06 DIAGNOSIS — Z82.49: ICD-10-CM

## 2018-01-06 DIAGNOSIS — F10.129: Primary | ICD-10-CM

## 2018-01-06 DIAGNOSIS — Z83.3: ICD-10-CM

## 2018-01-06 LAB
ALBUMIN SERPL-MCNC: 4.1 GM/DL (ref 3.4–5)
ALP SERPL-CCNC: 46 U/L (ref 45–117)
ALT SERPL-CCNC: 18 U/L (ref 12–78)
AST SERPL-CCNC: 23 U/L (ref 15–37)
BASOPHILS # BLD: 0.03 K/UL (ref 0–0.2)
BASOPHILS NFR BLD: 0.4 %
BUN SERPL-MCNC: 7 MG/DL (ref 7–18)
CALCIUM SERPL-MCNC: 8.5 MG/DL (ref 8.5–10.1)
CO2 SERPL-SCNC: 19 MMOL/L (ref 21–32)
CREAT SERPL-MCNC: 0.94 MG/DL (ref 0.6–1.2)
EOS ABS #: 0.08 K/UL (ref 0–0.5)
EOSINOPHIL NFR BLD AUTO: 256 K/UL (ref 130–400)
GLUCOSE SERPL-MCNC: 92 MG/DL (ref 70–99)
HCT VFR BLD CALC: 44.4 % (ref 37–47)
HGB BLD-MCNC: 15.4 G/DL (ref 12–16)
IG#: 0.01 K/UL (ref 0–0.02)
IMM GRANULOCYTES NFR BLD AUTO: 33.5 %
LYMPHOCYTES # BLD: 2.8 K/UL (ref 1.2–3.4)
MCH RBC QN AUTO: 32.6 PG (ref 25–34)
MCHC RBC AUTO-ENTMCNC: 34.7 G/DL (ref 32–36)
MCV RBC AUTO: 94.1 FL (ref 80–100)
MONO ABS #: 0.82 K/UL (ref 0.11–0.59)
MONOCYTES NFR BLD: 9.8 %
NEUT ABS #: 4.63 K/UL (ref 1.4–6.5)
NEUTROPHILS # BLD AUTO: 1 %
NEUTROPHILS NFR BLD AUTO: 55.2 %
PMV BLD AUTO: 10.5 FL (ref 7.4–10.4)
POTASSIUM SERPL-SCNC: 4 MMOL/L (ref 3.5–5.1)
PROT SERPL-MCNC: 7.9 GM/DL (ref 6.4–8.2)
RED CELL DISTRIBUTION WIDTH CV: 13.1 % (ref 11.5–14.5)
RED CELL DISTRIBUTION WIDTH SD: 45.2 FL (ref 36.4–46.3)
SODIUM SERPL-SCNC: 146 MMOL/L (ref 136–145)
WBC # BLD AUTO: 8.37 K/UL (ref 4.8–10.8)

## 2018-01-07 VITALS — HEART RATE: 98 BPM | DIASTOLIC BLOOD PRESSURE: 87 MMHG | SYSTOLIC BLOOD PRESSURE: 137 MMHG | OXYGEN SATURATION: 97 %

## 2018-01-07 NOTE — EMERGENCY ROOM VISIT NOTE
History


Report prepared by Janeenibcorina:  Navjot Boggs


Under the Supervision of:  Dr. Denver Richardson D.O.


First contact with patient:  21:45


Chief Complaint:  MENTAL HEALTH EVALUATION


Stated Complaint:  mhmr





History of Present Illness


The patient is a 37 year old female who presents to the Emergency Room via 

police with complaints of agitation . Patient states she has a history of 

alcoholism. She states she was drinking tonight at her parents. She denies 

drinking and driving tonight. She denies having any current suicidal ideations. 

Per nurse, the patient was trying to get into her aunt's house prior to 

arrival. Nurse states that the patient's mother then called the police on her. 

Nurse adds that there appears to be financial issues between the patient and 

her aunt and uncle. Patient states that her uncle's car dealership asked her 

for 60k dollars because "they know she has money".





   Source of History:  patient


   Onset:  Prior to arrival


   Position:  other (Global)


   Timing:  worsening


   Modifying Factors (Relieving):  other (None)


Note:


Patient denies suicidal ideations.





Review of Systems


See HPI for pertinent positives & negatives. A total of 10 systems reviewed and 

were otherwise negative.





Past Medical & Surgical


Medical Problems:


(1) Alcohol addiction


(2) Alcohol withdrawal


(3) Bacterial vaginosis


(4) Benign Hypertension


(5) Benign hypertension


(6) Calculus Of Kidney


(7) EtOH dependence


(8) Major depressive disorder, recurrent episode with anxious distress


(9) Nicotine dependence


(10) Ovarian Cyst Nec/Nos


(11) Overdose


Surgical Problems:


(1) History of removal of ovarian cyst








Family History





Diabetes mellitus


FH: cancer


FH: heart disease


Hypertension





Social History


Smoking Status:  Current Every Day Smoker


Alcohol Use:  none


Drug Use:  none


Housing Status:  lives with family


Occupation Status:  employed





Current/Historical Medications


Unable to Obtain Active Prescriptions or Reported Meds





Allergies


Coded Allergies:  


     No Known Allergies (Verified , 12/12/17)





Physical Exam


Vital Signs











  Date Time  Temp Pulse Resp B/P (MAP) Pulse Ox O2 Delivery O2 Flow Rate FiO2


 


1/7/18 00:59  98 20 137/87 97 Room Air  


 


1/6/18 22:36  122      


 


1/6/18 21:51 37.7 107 22 135/94 98 Room Air  











Physical Exam


CONSTITUTIONAL/VITAL SIGNS: Reviewed / noted above.


GENERAL: Non-toxic in appearance.  Smell of alcohol on the breath.


INTEGUMENTARY: Warm, dry, and Pink.


HEAD: Normocephalic.


EYES: without scleral icterus or trauma.


ENT/OROPHARYNX: clear and moist.


LYMPHADENOPATHY/NECK: Is supple without lymphadenopathy or meningismus.


RESPIRATORY: Lungs clear and equal.


CARDIOVASCULAR: Regular rate and rhythm.


GI/ABDOMEN: Soft and nontender. No organomegaly or pulsatile mass. No rebound 

or guarding. Normal bowel sounds.


EXTREMITIES: Warm and well perfused.


BACK: No CVA tenderness.


NEUROLOGICAL: Intact without focal deficits. 


PSYCHIATRIC: Agitated and uncooperative. Patient denies suicidal ideations.


MUSCULOSKELETAL: Normally developed with good muscle tone.





Medical Decision & Procedures


Laboratory Results


1/6/18 22:49








Red Blood Count 4.72, Mean Corpuscular Volume 94.1, Mean Corpuscular Hemoglobin 

32.6, Mean Corpuscular Hemoglobin Concent 34.7, Mean Platelet Volume 10.5, 

Neutrophils (%) (Auto) 55.2, Lymphocytes (%) (Auto) 33.5, Monocytes (%) (Auto) 

9.8, Eosinophils (%) (Auto) 1.0, Basophils (%) (Auto) 0.4, Neutrophils # (Auto) 

4.63, Lymphocytes # (Auto) 2.80, Monocytes # (Auto) 0.82, Eosinophils # (Auto) 

0.08, Basophils # (Auto) 0.03





1/6/18 22:49

















Test


  1/6/18


22:49


 


White Blood Count


  8.37 K/uL


(4.8-10.8)


 


Red Blood Count


  4.72 M/uL


(4.2-5.4)


 


Hemoglobin


  15.4 g/dL


(12.0-16.0)


 


Hematocrit 44.4 % (37-47) 


 


Mean Corpuscular Volume


  94.1 fL


()


 


Mean Corpuscular Hemoglobin


  32.6 pg


(25-34)


 


Mean Corpuscular Hemoglobin


Concent 34.7 g/dl


(32-36)


 


Platelet Count


  256 K/uL


(130-400)


 


Mean Platelet Volume


  10.5 fL


(7.4-10.4)


 


Neutrophils (%) (Auto) 55.2 % 


 


Lymphocytes (%) (Auto) 33.5 % 


 


Monocytes (%) (Auto) 9.8 % 


 


Eosinophils (%) (Auto) 1.0 % 


 


Basophils (%) (Auto) 0.4 % 


 


Neutrophils # (Auto)


  4.63 K/uL


(1.4-6.5)


 


Lymphocytes # (Auto)


  2.80 K/uL


(1.2-3.4)


 


Monocytes # (Auto)


  0.82 K/uL


(0.11-0.59)


 


Eosinophils # (Auto)


  0.08 K/uL


(0-0.5)


 


Basophils # (Auto)


  0.03 K/uL


(0-0.2)


 


RDW Standard Deviation


  45.2 fL


(36.4-46.3)


 


RDW Coefficient of Variation


  13.1 %


(11.5-14.5)


 


Immature Granulocyte % (Auto) 0.1 % 


 


Immature Granulocyte # (Auto)


  0.01 K/uL


(0.00-0.02)


 


Anion Gap


  14.0 mmol/L


(3-11)


 


Est Creatinine Clear Calc


Drug Dose 98.5 ml/min 


 


 


Estimated GFR (


American) 91.1 


 


 


Estimated GFR (Non-


American 78.6 


 


 


BUN/Creatinine Ratio 7.9 (10-20) 


 


Calcium Level


  8.5 mg/dl


(8.5-10.1)


 


Total Bilirubin


  0.7 mg/dl


(0.2-1)


 


Aspartate Amino Transf


(AST/SGOT) 23 U/L (15-37) 


 


 


Alanine Aminotransferase


(ALT/SGPT) 18 U/L (12-78) 


 


 


Alkaline Phosphatase


  46 U/L


()


 


Total Protein


  7.9 gm/dl


(6.4-8.2)


 


Albumin


  4.1 gm/dl


(3.4-5.0)


 


Globulin


  3.8 gm/dl


(2.5-4.0)


 


Albumin/Globulin Ratio 1.1 (0.9-2) 


 


Thyroid Stimulating Hormone


(TSH) 1.070 uIu/ml


(0.300-4.500)


 


Salicylates Level


  < 1.7 mg/dl


(2.8-20)


 


Acetaminophen Level


  < 2 ug/ml


(10-30)


 


Ethyl Alcohol mg/dL


  261.0 mg/dl


(0-3)





Laboratory results as stated above per my review.





ED Course


2230: Previous medical records were reviewed. The patient was evaluated in room 

A8. A complete history and physical examination was performed.





2330: Haldol Inj 5mg IM, Ativan Inj 2mg IM





0030: On reevaluation, the patient is resting comfortably. I discussed the 

results and findings with the patient. She verbalized agreement of the 

treatment plan. She was discharged home.





Medical Decision


differential includes toxic ingestions, self-mutilation, suicidal ideation, 

suicide attempt, depression.








The patient is a 37-year-old female who presents to the emergency department 

via police.  The patient reports that she had been drinking tonight with her 

family.  She states that she and her family started arguing.  She states they 

were arguing over money.  The patient was brought here by police and was in the 

outpatient area to have blood work drawn for DUI.  The patient states that she 

was not driving.  Because she was agitated, they dropped her off and the psych 

area.  She denies being suicidal or homicidal.  She admits to drinking alcohol 

tonight.  The patient had some paperwork filled out by her mother or other 

relatives who stated that she was going to harm herself.  Can help already 

evaluated the position and declined to support it.  Patient's blood work does 

reveal alcohol intoxication.  The patient was felt to be stable for discharge.  

She will be discharged with a sober ride.  Her mom picked her up.





Medication Reconcilliation


Current Medication List:  was personally reviewed by me





Blood Pressure Screening


Patient's blood pressure:  Normal blood pressure


Blood pressure disposition:  Did not require urgent referral





Impression





 Primary Impression:  


 Alcohol intoxication





Scribe Attestation


The scribe's documentation has been prepared under my direction and personally 

reviewed by me in its entirety. I confirm that the note above accurately 

reflects all work, treatment, procedures, and medical decision making performed 

by me.





Departure Information


Dispostion


Home / Self-Care





Prescriptions





Unable to Obtain Active Prescriptions or Reported Meds





Referrals


No Doctor, Assigned (PCP)





Forms


HOME CARE DOCUMENTATION FORM,                                                 

               IMPORTANT VISIT INFORMATION





Patient Instructions


Alcohol Intoxication - [facility], [facility]





Additional Instructions





Avoid alcohol consumption.

## 2018-02-21 ENCOUNTER — HOSPITAL ENCOUNTER (OUTPATIENT)
Dept: HOSPITAL 45 - C.RAD | Age: 38
Discharge: HOME | End: 2018-02-21
Attending: NURSE PRACTITIONER
Payer: COMMERCIAL

## 2018-02-21 DIAGNOSIS — K59.00: Primary | ICD-10-CM

## 2018-02-21 DIAGNOSIS — K56.1: ICD-10-CM

## 2018-02-21 NOTE — DIAGNOSTIC IMAGING REPORT
SMALL BOWEL STUDY



CLINICAL HISTORY: Constipation. Abnormal CT scan.   



COMPARISON STUDY:  CT of the abdomen and pelvis December 12, 2017.



FLUOROSCOPY TIME: 0.6 minutes.



TECHNIQUE: A  KUB was obtained. A small bowel follow-through was then

performed utilizing Gastrografin as ordered.



FINDINGS: Bowel gas pattern is normal. Intrauterine device is noted. Mucosal

detail is diminished given Gastrografin. However, the caliber of the jejunum and

ileum are normal. No mucosal abnormality is identified. The terminal ileum is

not well visualized on this exam. The left upper quadrant jejunal

intussusception shown on CT of December 17, 2017 is not visualized on this exam

and was likely transient. Transit time to the cecum was normal at 40 minutes.



IMPRESSION:  



1. Unremarkable Gastrografin small bowel follow-through.



2. Nonvisualization of the left upper quadrant jejunal intussusception shown on

prior CT which was likely transient. Diminished mucosal detail given

Gastrografin. 







Electronically signed by:  Ryan Nobles M.D.

2/21/2018 11:52 AM



Dictated Date/Time:  2/21/2018 11:50 AM

## 2018-08-08 ENCOUNTER — HOSPITAL ENCOUNTER (OUTPATIENT)
Dept: HOSPITAL 45 - C.PAPS | Age: 38
Discharge: HOME | End: 2018-08-08
Attending: OBSTETRICS & GYNECOLOGY
Payer: COMMERCIAL

## 2018-08-08 ENCOUNTER — HOSPITAL ENCOUNTER (OUTPATIENT)
Dept: HOSPITAL 45 - C.LABSPEC | Age: 38
Discharge: HOME | End: 2018-08-08
Attending: OBSTETRICS & GYNECOLOGY
Payer: COMMERCIAL

## 2018-08-08 DIAGNOSIS — N93.9: Primary | ICD-10-CM

## 2018-08-08 DIAGNOSIS — R30.0: ICD-10-CM

## 2018-08-08 DIAGNOSIS — Z11.3: Primary | ICD-10-CM

## 2018-08-08 DIAGNOSIS — N89.8: ICD-10-CM
